# Patient Record
Sex: FEMALE | Race: WHITE | NOT HISPANIC OR LATINO | URBAN - METROPOLITAN AREA
[De-identification: names, ages, dates, MRNs, and addresses within clinical notes are randomized per-mention and may not be internally consistent; named-entity substitution may affect disease eponyms.]

---

## 2017-09-10 ENCOUNTER — INPATIENT (INPATIENT)
Facility: HOSPITAL | Age: 77
LOS: 4 days | Discharge: HOME | End: 2017-09-15
Attending: SURGERY | Admitting: SURGERY
Payer: MEDICARE

## 2017-09-10 VITALS
SYSTOLIC BLOOD PRESSURE: 138 MMHG | OXYGEN SATURATION: 100 % | HEART RATE: 78 BPM | TEMPERATURE: 98 F | RESPIRATION RATE: 16 BRPM | DIASTOLIC BLOOD PRESSURE: 71 MMHG

## 2017-09-10 DIAGNOSIS — K85.90 ACUTE PANCREATITIS WITHOUT NECROSIS OR INFECTION, UNSPECIFIED: ICD-10-CM

## 2017-09-10 DIAGNOSIS — K85.10 BILIARY ACUTE PANCREATITIS WITHOUT NECROSIS OR INFECTION: ICD-10-CM

## 2017-09-10 LAB
ALBUMIN SERPL ELPH-MCNC: 3.7 G/DL — SIGNIFICANT CHANGE UP (ref 3.3–5)
ALP SERPL-CCNC: 295 U/L — HIGH (ref 40–120)
ALT FLD-CCNC: 647 U/L — HIGH (ref 4–33)
APPEARANCE UR: CLEAR — SIGNIFICANT CHANGE UP
AST SERPL-CCNC: 414 U/L — HIGH (ref 4–32)
BASOPHILS # BLD AUTO: 0.01 K/UL — SIGNIFICANT CHANGE UP (ref 0–0.2)
BASOPHILS NFR BLD AUTO: 0.1 % — SIGNIFICANT CHANGE UP (ref 0–2)
BASOPHILS NFR SPEC: 0 % — SIGNIFICANT CHANGE UP (ref 0–2)
BILIRUB SERPL-MCNC: 5.3 MG/DL — HIGH (ref 0.2–1.2)
BILIRUB UR-MCNC: SIGNIFICANT CHANGE UP
BLOOD UR QL VISUAL: NEGATIVE — SIGNIFICANT CHANGE UP
BUN SERPL-MCNC: 20 MG/DL — SIGNIFICANT CHANGE UP (ref 7–23)
CALCIUM SERPL-MCNC: 8.7 MG/DL — SIGNIFICANT CHANGE UP (ref 8.4–10.5)
CHLORIDE SERPL-SCNC: 99 MMOL/L — SIGNIFICANT CHANGE UP (ref 98–107)
CK MB BLD-MCNC: 2.34 NG/ML — SIGNIFICANT CHANGE UP (ref 1–4.7)
CK SERPL-CCNC: 108 U/L — SIGNIFICANT CHANGE UP (ref 25–170)
CO2 SERPL-SCNC: 22 MMOL/L — SIGNIFICANT CHANGE UP (ref 22–31)
COLOR SPEC: YELLOW — SIGNIFICANT CHANGE UP
CREAT SERPL-MCNC: 0.97 MG/DL — SIGNIFICANT CHANGE UP (ref 0.5–1.3)
EOSINOPHIL # BLD AUTO: 0.01 K/UL — SIGNIFICANT CHANGE UP (ref 0–0.5)
EOSINOPHIL NFR BLD AUTO: 0.1 % — SIGNIFICANT CHANGE UP (ref 0–6)
EOSINOPHIL NFR FLD: 0 % — SIGNIFICANT CHANGE UP (ref 0–6)
GIANT PLATELETS BLD QL SMEAR: PRESENT — SIGNIFICANT CHANGE UP
GLUCOSE SERPL-MCNC: 128 MG/DL — HIGH (ref 70–99)
GLUCOSE UR-MCNC: NEGATIVE — SIGNIFICANT CHANGE UP
HCT VFR BLD CALC: 43 % — SIGNIFICANT CHANGE UP (ref 34.5–45)
HGB BLD-MCNC: 14.5 G/DL — SIGNIFICANT CHANGE UP (ref 11.5–15.5)
IMM GRANULOCYTES # BLD AUTO: 0.03 # — SIGNIFICANT CHANGE UP
IMM GRANULOCYTES NFR BLD AUTO: 0.3 % — SIGNIFICANT CHANGE UP (ref 0–1.5)
KETONES UR-MCNC: SIGNIFICANT CHANGE UP
LEUKOCYTE ESTERASE UR-ACNC: HIGH
LIDOCAIN IGE QN: > 600 U/L — HIGH (ref 7–60)
LYMPHOCYTES # BLD AUTO: 0.44 K/UL — LOW (ref 1–3.3)
LYMPHOCYTES # BLD AUTO: 3.9 % — LOW (ref 13–44)
LYMPHOCYTES NFR SPEC AUTO: 2.6 % — LOW (ref 13–44)
MANUAL SMEAR VERIFICATION: SIGNIFICANT CHANGE UP
MCHC RBC-ENTMCNC: 29.2 PG — SIGNIFICANT CHANGE UP (ref 27–34)
MCHC RBC-ENTMCNC: 33.7 % — SIGNIFICANT CHANGE UP (ref 32–36)
MCV RBC AUTO: 86.7 FL — SIGNIFICANT CHANGE UP (ref 80–100)
MONOCYTES # BLD AUTO: 0.62 K/UL — SIGNIFICANT CHANGE UP (ref 0–0.9)
MONOCYTES NFR BLD AUTO: 5.5 % — SIGNIFICANT CHANGE UP (ref 2–14)
MONOCYTES NFR BLD: 3.5 % — SIGNIFICANT CHANGE UP (ref 2–9)
MORPHOLOGY BLD-IMP: NORMAL — SIGNIFICANT CHANGE UP
NEUTROPHIL AB SER-ACNC: 93 % — HIGH (ref 43–77)
NEUTROPHILS # BLD AUTO: 10.16 K/UL — HIGH (ref 1.8–7.4)
NEUTROPHILS NFR BLD AUTO: 90.1 % — HIGH (ref 43–77)
NEUTS BAND # BLD: 0.9 % — SIGNIFICANT CHANGE UP (ref 0–6)
NITRITE UR-MCNC: NEGATIVE — SIGNIFICANT CHANGE UP
NRBC # FLD: 0 — SIGNIFICANT CHANGE UP
PH UR: 6 — SIGNIFICANT CHANGE UP (ref 4.6–8)
PLATELET # BLD AUTO: 176 K/UL — SIGNIFICANT CHANGE UP (ref 150–400)
PLATELET COUNT - ESTIMATE: NORMAL — SIGNIFICANT CHANGE UP
PMV BLD: 10.6 FL — SIGNIFICANT CHANGE UP (ref 7–13)
POTASSIUM SERPL-MCNC: 4.4 MMOL/L — SIGNIFICANT CHANGE UP (ref 3.5–5.3)
POTASSIUM SERPL-SCNC: 4.4 MMOL/L — SIGNIFICANT CHANGE UP (ref 3.5–5.3)
PROT SERPL-MCNC: 7 G/DL — SIGNIFICANT CHANGE UP (ref 6–8.3)
PROT UR-MCNC: 10 — SIGNIFICANT CHANGE UP
RBC # BLD: 4.96 M/UL — SIGNIFICANT CHANGE UP (ref 3.8–5.2)
RBC # FLD: 12.4 % — SIGNIFICANT CHANGE UP (ref 10.3–14.5)
RBC CASTS # UR COMP ASSIST: SIGNIFICANT CHANGE UP (ref 0–?)
SODIUM SERPL-SCNC: 138 MMOL/L — SIGNIFICANT CHANGE UP (ref 135–145)
SP GR SPEC: 1.01 — SIGNIFICANT CHANGE UP (ref 1–1.03)
SQUAMOUS # UR AUTO: SIGNIFICANT CHANGE UP
TROPONIN T SERPL-MCNC: < 0.06 NG/ML — SIGNIFICANT CHANGE UP (ref 0–0.06)
UROBILINOGEN FLD QL: NORMAL E.U. — SIGNIFICANT CHANGE UP (ref 0.1–0.2)
WBC # BLD: 11.27 K/UL — HIGH (ref 3.8–10.5)
WBC # FLD AUTO: 11.27 K/UL — HIGH (ref 3.8–10.5)
WBC UR QL: SIGNIFICANT CHANGE UP (ref 0–?)

## 2017-09-10 PROCEDURE — 71020: CPT | Mod: 26

## 2017-09-10 PROCEDURE — 76705 ECHO EXAM OF ABDOMEN: CPT | Mod: 26

## 2017-09-10 RX ORDER — MORPHINE SULFATE 50 MG/1
2 CAPSULE, EXTENDED RELEASE ORAL EVERY 4 HOURS
Qty: 0 | Refills: 0 | Status: DISCONTINUED | OUTPATIENT
Start: 2017-09-10 | End: 2017-09-11

## 2017-09-10 RX ORDER — CEFOTETAN DISODIUM 1 G
2 VIAL (EA) INJECTION ONCE
Qty: 0 | Refills: 0 | Status: COMPLETED | OUTPATIENT
Start: 2017-09-10 | End: 2017-09-11

## 2017-09-10 RX ORDER — SODIUM CHLORIDE 9 MG/ML
1000 INJECTION INTRAMUSCULAR; INTRAVENOUS; SUBCUTANEOUS ONCE
Qty: 0 | Refills: 0 | Status: COMPLETED | OUTPATIENT
Start: 2017-09-10 | End: 2017-09-10

## 2017-09-10 RX ORDER — CEFOTETAN DISODIUM 1 G
2 VIAL (EA) INJECTION EVERY 12 HOURS
Qty: 0 | Refills: 0 | Status: DISCONTINUED | OUTPATIENT
Start: 2017-09-11 | End: 2017-09-14

## 2017-09-10 RX ORDER — ACETAMINOPHEN 500 MG
650 TABLET ORAL ONCE
Qty: 0 | Refills: 0 | Status: COMPLETED | OUTPATIENT
Start: 2017-09-10 | End: 2017-09-10

## 2017-09-10 RX ORDER — PANTOPRAZOLE SODIUM 20 MG/1
40 TABLET, DELAYED RELEASE ORAL DAILY
Qty: 0 | Refills: 0 | Status: DISCONTINUED | OUTPATIENT
Start: 2017-09-10 | End: 2017-09-14

## 2017-09-10 RX ORDER — CEFOTETAN DISODIUM 1 G
VIAL (EA) INJECTION
Qty: 0 | Refills: 0 | Status: DISCONTINUED | OUTPATIENT
Start: 2017-09-11 | End: 2017-09-14

## 2017-09-10 RX ORDER — FAMOTIDINE 10 MG/ML
20 INJECTION INTRAVENOUS ONCE
Qty: 0 | Refills: 0 | Status: COMPLETED | OUTPATIENT
Start: 2017-09-10 | End: 2017-09-10

## 2017-09-10 RX ORDER — LIDOCAINE 4 G/100G
10 CREAM TOPICAL ONCE
Qty: 0 | Refills: 0 | Status: COMPLETED | OUTPATIENT
Start: 2017-09-10 | End: 2017-09-10

## 2017-09-10 RX ORDER — ENOXAPARIN SODIUM 100 MG/ML
40 INJECTION SUBCUTANEOUS DAILY
Qty: 0 | Refills: 0 | Status: DISCONTINUED | OUTPATIENT
Start: 2017-09-10 | End: 2017-09-15

## 2017-09-10 RX ORDER — MORPHINE SULFATE 50 MG/1
2 CAPSULE, EXTENDED RELEASE ORAL ONCE
Qty: 0 | Refills: 0 | Status: DISCONTINUED | OUTPATIENT
Start: 2017-09-10 | End: 2017-09-10

## 2017-09-10 RX ORDER — ONDANSETRON 8 MG/1
4 TABLET, FILM COATED ORAL ONCE
Qty: 0 | Refills: 0 | Status: COMPLETED | OUTPATIENT
Start: 2017-09-10 | End: 2017-09-10

## 2017-09-10 RX ORDER — SODIUM CHLORIDE 9 MG/ML
1000 INJECTION INTRAMUSCULAR; INTRAVENOUS; SUBCUTANEOUS
Qty: 0 | Refills: 0 | Status: DISCONTINUED | OUTPATIENT
Start: 2017-09-10 | End: 2017-09-11

## 2017-09-10 RX ADMIN — MORPHINE SULFATE 2 MILLIGRAM(S): 50 CAPSULE, EXTENDED RELEASE ORAL at 23:22

## 2017-09-10 RX ADMIN — FAMOTIDINE 20 MILLIGRAM(S): 10 INJECTION INTRAVENOUS at 20:04

## 2017-09-10 RX ADMIN — SODIUM CHLORIDE 1000 MILLILITER(S): 9 INJECTION INTRAMUSCULAR; INTRAVENOUS; SUBCUTANEOUS at 20:04

## 2017-09-10 RX ADMIN — MORPHINE SULFATE 2 MILLIGRAM(S): 50 CAPSULE, EXTENDED RELEASE ORAL at 22:06

## 2017-09-10 RX ADMIN — Medication 30 MILLILITER(S): at 20:04

## 2017-09-10 RX ADMIN — SODIUM CHLORIDE 85 MILLILITER(S): 9 INJECTION INTRAMUSCULAR; INTRAVENOUS; SUBCUTANEOUS at 23:38

## 2017-09-10 RX ADMIN — ONDANSETRON 4 MILLIGRAM(S): 8 TABLET, FILM COATED ORAL at 22:06

## 2017-09-10 RX ADMIN — Medication 650 MILLIGRAM(S): at 20:04

## 2017-09-10 RX ADMIN — LIDOCAINE 10 MILLILITER(S): 4 CREAM TOPICAL at 20:04

## 2017-09-10 NOTE — ED PROVIDER NOTE - MEDICAL DECISION MAKING DETAILS
76 yo with epigastric burning pain; gastritis vs gerd vs pancreatitis secondary to gallstones; cbc cmp lipase troponin ekg fluids gi cocktail xray gallbladder sono --> re eval

## 2017-09-10 NOTE — H&P ADULT - NSHPREVIEWOFSYSTEMS_GEN_ALL_CORE
She has no significant medical history. She has not previously had any surgery. She denied having had cardiovascular, pulmonary, renal, hepatic, hematologic, CNS, endocrine and / or neoplastic illnesses. Prior to the current hospitalization she did not take any medications. She has no known medication allergies and she is not allergic to latex. She does not use ethanol or tobacco.    Her 10-point review of systems is otherwise negative.

## 2017-09-10 NOTE — H&P ADULT - NSHPLABSRESULTS_GEN_ALL_CORE
CBC Full  -  ( 10 Sep 2017 19:00 )  WBC Count : 11.27 K/uL  Hemoglobin : 14.5 g/dL  Hematocrit : 43.0 %  Platelet Count - Automated : 176 K/uL  Mean Cell Volume : 86.7 fL  Mean Cell Hemoglobin : 29.2 pg  Mean Cell Hemoglobin Concentration : 33.7 %  Auto Neutrophil # : 10.16 K/uL  Auto Lymphocyte # : 0.44 K/uL  Auto Monocyte # : 0.62 K/uL  Auto Eosinophil # : 0.01 K/uL  Auto Basophil # : 0.01 K/uL  Auto Neutrophil % : 90.1 %  Auto Lymphocyte % : 3.9 %  Auto Monocyte % : 5.5 %  Auto Eosinophil % : 0.1 %  Auto Basophil % : 0.1 %    Comprehensive Metabolic Panel (09.10.17 @ 19:00)    Sodium, Serum: 138 mmol/L    Potassium, Serum: 4.4: SPECIMEN MODERATELY HEMOLYZED mmol/L    Chloride, Serum: 99 mmol/L    Carbon Dioxide, Serum: 22 mmol/L    Blood Urea Nitrogen, Serum: 20 mg/dL    Creatinine, Serum: 0.97 mg/dL    Glucose, Serum: 128 mg/dL    Calcium, Total Serum: 8.7 mg/dL    Protein Total, Serum: 7.0 g/dL    Albumin, Serum: 3.7 g/dL    Bilirubin Total, Serum: 5.3 mg/dL    Alkaline Phosphatase, Serum: 295: Please note new reference ranges are adjusted for age and  gender. u/L    Aspartate Aminotransferase (AST/SGOT): 414 u/L    Alanine Aminotransferase (ALT/SGPT): 647 u/L    eGFR if Non : 56: The units for eGFR are ml/min/1.73m2 (normalized body  surface area). The eGFR is calculated from a serum  creatinine using the CKD-EPI equation. Other variables  required for calculation are race, age and sex. Among  patients with chronic kidney dise56: ase (CKD), the eGFR is  useful in determining the stage of disease according to  KDOQI CKD classification. All eGFR results are reported  numerically with the following interpretation.    GFR  (ml/min/1.73 m2)          W/KIDNEY DAMAGE    W/O KIDNEY DM56: G  ==========================================================  >= 90.......................Stage 1..............Normal  60-89.......................Stage 2...........Decreased GFR  30-59.......................Stage 3..............Stage 3  15-29.......56: ................Stage 4..............Stage 4  < 15........................Stage 5..............Stage 5    < from: US Abdomen Limited (09.10.17 @ 20:37) >      EXAM:  US ABDOMEN LIMITED        PROCEDURE DATE:  Sep 10 2017         INTERPRETATION:  CLINICAL INFORMATION: Epigastric/right upper quadrant   abdominal pain for one day.    COMPARISON: None available.    TECHNIQUE: Sonography of the right upper quadrant.     FINDINGS:    Liver: 14.0 cm. Within normal limits.  Bile ducts: Normal caliber. Common bile duct is dilated measuring 11 mm   without discrete evidence of choledocholithiasis.  Gallbladder: Cholelithiasis. No gallbladder wall thickening or  pericholecystic fluid. No sonographic Acosta's sign.   Pancreas: Visualized portions are within normal limits.  Right kidney: 9.1 cm. No hydronephrosis.  Ascites: None.  IVC: Visualized portions are within normal limits.    IMPRESSION:     1. Cholelithiasis without sonographic evidence of cholecystitis.  2. Dilated common bile duct without discrete evidence of   choledocholithiasis. Correlate with LFTs.  BETSY MORRISON M.D., Radiology Resident  This document has been electronically signed.  JOSE JORDAN M.D., ATTENDING RADIOLOGIST  This document has been electronically signed. Sep 10 2017  9:57PM

## 2017-09-10 NOTE — ED ADULT NURSE NOTE - OBJECTIVE STATEMENT
Pt to spot # 28 alert and oriented X 3 with family at bedside, pt comes with  epigastric pains intermittent x past mo, increased  since yesterday, following meal. pain radiates to back intermittently, denies presently. vomited x 1 yesterday. denies nausea. pmh- gallstones, labs drawn and sent , vitals as noted, waiting for MD caldwell will monitor

## 2017-09-10 NOTE — ED PROVIDER NOTE - ATTENDING CONTRIBUTION TO CARE
Attending note:   After face to face evaluation of this patient, I concur with above noted hx, pe, and care plan for this patient. +Epigastric pain for 24 hours in this F with h/o gallstones.   No cp.   +Epiogastric pain on exam.  evaluation in progress

## 2017-09-10 NOTE — H&P ADULT - NSHPPHYSICALEXAM_GEN_ALL_CORE
Vital Signs Last 24 Hrs  T(C): 37 (10 Sep 2017 20:38), Max: 37 (10 Sep 2017 20:38)  T(F): 98.6 (10 Sep 2017 20:38), Max: 98.6 (10 Sep 2017 20:38)  HR: 80 (10 Sep 2017 20:38) (78 - 80)  BP: 136/71 (10 Sep 2017 20:38) (136/71 - 138/69)  RR: 16 (10 Sep 2017 20:38) (16 - 16)  SpO2: 100% (10 Sep 2017 20:38) (100% - 100%)    She was awake, alert and in no distress  She was anicteric and she did not have thrush. Her oropharyngeal mucosa was normal. She had reactive pupils and her extra-occular movements were intact. She did not have JVD. Her lungs were clear bilaterally and she had non-labored respirations. She had symmetrical chest wall movements. She had regular heart tones and she did not have a murmur or gallop. Her abdomen was soft, nondistended with tenderness in the right lower quadrant. She did have rebound. - Psoas and obturator signs. There were no palpable masses or abdominal wall hernias. She had active bowel sounds. She had normal external genitalia. She did not have a rash. Her extremities were well perfused. She had a normal musculoskeletal exam. Vital Signs Last 24 Hrs  T(C): 37 (10 Sep 2017 20:38), Max: 37 (10 Sep 2017 20:38)  T(F): 98.6 (10 Sep 2017 20:38), Max: 98.6 (10 Sep 2017 20:38)  HR: 80 (10 Sep 2017 20:38) (78 - 80)  BP: 136/71 (10 Sep 2017 20:38) (136/71 - 138/69)  RR: 16 (10 Sep 2017 20:38) (16 - 16)  SpO2: 100% (10 Sep 2017 20:38) (100% - 100%)    She was awake, alert and in no distress  She was anicteric and she did not have thrush. Her oropharyngeal mucosa was normal. She had reactive pupils and her extra-occular movements were intact. She did not have JVD. Her lungs were clear bilaterally and she had non-labored respirations. She had symmetrical chest wall movements. She had regular heart tones and she did not have a murmur or gallop. Her abdomen was soft, nondistended with tenderness in the right upper quadrant. There were no palpable masses or abdominal wall hernias. She had active bowel sounds. She had normal external genitalia. She did not have a rash. Her extremities were well perfused. She had a normal musculoskeletal exam.

## 2017-09-10 NOTE — H&P ADULT - HISTORY OF PRESENT ILLNESS
This is a very pleasant 77 year old female who presents with abdominal pain which began at 24 hours prior to admission. It was initially located in the right upper quadrant/epigastrium which radiated to her back.  She describes the pain as dull nonradiating and constant. No alleviating factors could be elicited.  She is accompanied by her daughter. She denies any recent trauma. The pain was without fever, chills,or, diarrhea. She has had several episodes of NBNB emesis. She has not had any recent sick exposures and denied having had foreign travel. She has not had urinary symptoms of urgency, frequency or dysuria. She is not sexually active.    [facility] ED demonstrated transaminitis, hyperlipasemia, and leukocytosis. U/S demonstrated CBD 11mm without evidence of choledocholithiasis. Cholelithiasis present.

## 2017-09-10 NOTE — ED PROVIDER NOTE - PROGRESS NOTE DETAILS
patient does not want ct scan because was told by surgery that she does not need scan as she is getting a mrcp tomorrow. -Gavino

## 2017-09-10 NOTE — H&P ADULT - PROBLEM SELECTOR PLAN 1
1)	Admit her to the  Surgical Service under Dr. Lora   2) 	Keep her NPO and give parenteral fluid to treat her dehydration.  3)	Treat her with parenteral antibiotics pre-operatively, Cefotetan  4)	Give narcotics to treat her acute pain PRN  5)	Provide DVT prophylaxis, venodynes intraoperatively  6)           Trend LFTs, if persistent will proceed with MRCP and/or GI consultation.   7)	Plan to have her undergo laparoscopic Cholecystectomy. The risks, benefits, and alternatives to the planned operation were presented to the patient and her mother and father as were the risks and benefits of the alternatives. The alternatives are non-operative treatment.   8)	Full support in place

## 2017-09-10 NOTE — ED PROVIDER NOTE - OBJECTIVE STATEMENT
76 yo female with no pmh presenting with intermittent 1 month hx of epigastric burning pain with no radiation.  over the last day, pain has become more constant after patient vomited once yesterday after taking aspirin and eating ribs.  7-8/10 in severity.  worse when eating food, not improved with anything.  known hx of gallstones.  denies fevers chills diarrhea.  endorses urinary frequency x 2 days.  not currently nauseous.    PCP- Neal Guevara

## 2017-09-10 NOTE — ED ADULT TRIAGE NOTE - CHIEF COMPLAINT QUOTE
epigastric pains intermittent x past mo, increased  since yesterday,following meal. pain radiates to back intermittently,denies presently. vomited x 1 yesterday. denies nausea. pmh- gallstones

## 2017-09-11 LAB
ALBUMIN SERPL ELPH-MCNC: 3.3 G/DL — SIGNIFICANT CHANGE UP (ref 3.3–5)
ALP SERPL-CCNC: 259 U/L — HIGH (ref 40–120)
ALT FLD-CCNC: 456 U/L — HIGH (ref 4–33)
AST SERPL-CCNC: 227 U/L — HIGH (ref 4–32)
BILIRUB SERPL-MCNC: 5.5 MG/DL — HIGH (ref 0.2–1.2)
BUN SERPL-MCNC: 14 MG/DL — SIGNIFICANT CHANGE UP (ref 7–23)
CALCIUM SERPL-MCNC: 8.2 MG/DL — LOW (ref 8.4–10.5)
CHLORIDE SERPL-SCNC: 105 MMOL/L — SIGNIFICANT CHANGE UP (ref 98–107)
CO2 SERPL-SCNC: 23 MMOL/L — SIGNIFICANT CHANGE UP (ref 22–31)
CREAT SERPL-MCNC: 1 MG/DL — SIGNIFICANT CHANGE UP (ref 0.5–1.3)
GLUCOSE SERPL-MCNC: 91 MG/DL — SIGNIFICANT CHANGE UP (ref 70–99)
HCT VFR BLD CALC: 39.4 % — SIGNIFICANT CHANGE UP (ref 34.5–45)
HGB BLD-MCNC: 13.3 G/DL — SIGNIFICANT CHANGE UP (ref 11.5–15.5)
LIDOCAIN IGE QN: > 600 U/L — HIGH (ref 7–60)
MCHC RBC-ENTMCNC: 29.4 PG — SIGNIFICANT CHANGE UP (ref 27–34)
MCHC RBC-ENTMCNC: 33.8 % — SIGNIFICANT CHANGE UP (ref 32–36)
MCV RBC AUTO: 87 FL — SIGNIFICANT CHANGE UP (ref 80–100)
NRBC # FLD: 0 — SIGNIFICANT CHANGE UP
PLATELET # BLD AUTO: 150 K/UL — SIGNIFICANT CHANGE UP (ref 150–400)
PMV BLD: 10.3 FL — SIGNIFICANT CHANGE UP (ref 7–13)
POTASSIUM SERPL-MCNC: 3.6 MMOL/L — SIGNIFICANT CHANGE UP (ref 3.5–5.3)
POTASSIUM SERPL-SCNC: 3.6 MMOL/L — SIGNIFICANT CHANGE UP (ref 3.5–5.3)
PROT SERPL-MCNC: 5.8 G/DL — LOW (ref 6–8.3)
RBC # BLD: 4.53 M/UL — SIGNIFICANT CHANGE UP (ref 3.8–5.2)
RBC # FLD: 12.5 % — SIGNIFICANT CHANGE UP (ref 10.3–14.5)
SODIUM SERPL-SCNC: 142 MMOL/L — SIGNIFICANT CHANGE UP (ref 135–145)
WBC # BLD: 9.34 K/UL — SIGNIFICANT CHANGE UP (ref 3.8–10.5)
WBC # FLD AUTO: 9.34 K/UL — SIGNIFICANT CHANGE UP (ref 3.8–10.5)

## 2017-09-11 PROCEDURE — 99233 SBSQ HOSP IP/OBS HIGH 50: CPT

## 2017-09-11 PROCEDURE — 74182 MRI ABDOMEN W/CONTRAST: CPT | Mod: 26

## 2017-09-11 RX ORDER — SODIUM CHLORIDE 9 MG/ML
1000 INJECTION, SOLUTION INTRAVENOUS
Qty: 0 | Refills: 0 | Status: DISCONTINUED | OUTPATIENT
Start: 2017-09-11 | End: 2017-09-14

## 2017-09-11 RX ADMIN — Medication 100 GRAM(S): at 00:32

## 2017-09-11 RX ADMIN — Medication 100 GRAM(S): at 18:15

## 2017-09-11 RX ADMIN — SODIUM CHLORIDE 85 MILLILITER(S): 9 INJECTION INTRAMUSCULAR; INTRAVENOUS; SUBCUTANEOUS at 13:29

## 2017-09-11 RX ADMIN — PANTOPRAZOLE SODIUM 40 MILLIGRAM(S): 20 TABLET, DELAYED RELEASE ORAL at 13:27

## 2017-09-11 RX ADMIN — Medication 100 GRAM(S): at 13:28

## 2017-09-11 RX ADMIN — ENOXAPARIN SODIUM 40 MILLIGRAM(S): 100 INJECTION SUBCUTANEOUS at 13:28

## 2017-09-11 NOTE — PROGRESS NOTE ADULT - SUBJECTIVE AND OBJECTIVE BOX
B Team Surgery    SUBJECTIVE: Pt reports feeling better then last night and having decreased abd pain.  PT denies N/V, f/c and/or back pain at this time.     MEDICATIONS  (STANDING):  sodium chloride 0.9%. 1000 milliLiter(s) (85 mL/Hr) IV Continuous <Continuous>  enoxaparin Injectable 40 milliGRAM(s) SubCutaneous daily  pantoprazole  Injectable 40 milliGRAM(s) IV Push daily  cefoTEtan  IVPB      cefoTEtan  IVPB 2 Gram(s) IV Intermittent every 12 hours    MEDICATIONS  (PRN):  morphine  - Injectable 2 milliGRAM(s) IV Push every 4 hours PRN Severe Pain (7 - 10)      Physical Exam:    Vital Signs Last 24 Hrs  T(C): 37.1 (11 Sep 2017 05:12), Max: 37.2 (11 Sep 2017 02:34)  T(F): 98.8 (11 Sep 2017 05:12), Max: 98.9 (11 Sep 2017 02:34)  HR: 73 (11 Sep 2017 05:12) (73 - 80)  BP: 119/50 (11 Sep 2017 05:12) (119/50 - 138/71)  BP(mean): --  RR: 16 (11 Sep 2017 05:12) (16 - 16)  SpO2: 96% (11 Sep 2017 05:12) (96% - 100%)    General:  Pt in NAD, A & O x3    Abdominal: soft, RUQ and epigastric tenderness, mildly distended.  No guarding no rebound tenderness    Ext: warm B/L LE no edema       I&O's Detail    10 Sep 2017 07:01  -  11 Sep 2017 07:00  --------------------------------------------------------  IN:    sodium chloride 0.9%.: 510 mL  Total IN: 510 mL    OUT:  Total OUT: 0 mL    Total NET: 510 mL          LABS:                        13.3   9.34  )-----------( 150      ( 11 Sep 2017 05:30 )             39.4     09-11    142  |  105  |  14  ----------------------------<  91  3.6   |  23  |  1.00    Ca    8.2<L>      11 Sep 2017 05:30    TPro  5.8<L>  /  Alb  3.3  /  TBili  5.5<H>  /  DBili  x   /  AST  227<H>  /  ALT  456<H>  /  AlkPhos  259<H>        Urinalysis Basic - ( 10 Sep 2017 21:00 )    Color: YELLOW / Appearance: CLEAR / S.009 / pH: 6.0  Gluc: NEGATIVE / Ketone: SMALL  / Bili: SMALL / Urobili: NORMAL E.U.   Blood: NEGATIVE / Protein: 10 / Nitrite: NEGATIVE   Leuk Esterase: SMALL / RBC: 10-25 / WBC 2-5   Sq Epi: OCC / Non Sq Epi: x / Bacteria: x       A/P: 78 yo F with gallstone pancreatitis     Neuro:  CV:   GI:    Renal:  Endocrine: B Team Surgery    SUBJECTIVE: Pt reports feeling better then last night and having decreased abd pain.  PT denies N/V, f/c and/or back pain at this time.     MEDICATIONS  (STANDING):  sodium chloride 0.9%. 1000 milliLiter(s) (85 mL/Hr) IV Continuous <Continuous>  enoxaparin Injectable 40 milliGRAM(s) SubCutaneous daily  pantoprazole  Injectable 40 milliGRAM(s) IV Push daily  cefoTEtan  IVPB      cefoTEtan  IVPB 2 Gram(s) IV Intermittent every 12 hours    MEDICATIONS  (PRN):  morphine  - Injectable 2 milliGRAM(s) IV Push every 4 hours PRN Severe Pain (7 - 10)      Physical Exam:    Vital Signs Last 24 Hrs  T(C): 37.1 (11 Sep 2017 05:12), Max: 37.2 (11 Sep 2017 02:34)  T(F): 98.8 (11 Sep 2017 05:12), Max: 98.9 (11 Sep 2017 02:34)  HR: 73 (11 Sep 2017 05:12) (73 - 80)  BP: 119/50 (11 Sep 2017 05:12) (119/50 - 138/71)  BP(mean): --  RR: 16 (11 Sep 2017 05:12) (16 - 16)  SpO2: 96% (11 Sep 2017 05:12) (96% - 100%)    General:  Pt in NAD, A & O x3    Abdominal: soft, RUQ and epigastric tenderness, mildly distended.  No guarding no rebound tenderness    Ext: warm B/L LE no edema       I&O's Detail    10 Sep 2017 07:01  -  11 Sep 2017 07:00  --------------------------------------------------------  IN:    sodium chloride 0.9%.: 510 mL  Total IN: 510 mL    OUT:  Total OUT: 0 mL    Total NET: 510 mL          LABS:                        13.3   9.34  )-----------( 150      ( 11 Sep 2017 05:30 )             39.4     09-11    142  |  105  |  14  ----------------------------<  91  3.6   |  23  |  1.00    Ca    8.2<L>      11 Sep 2017 05:30    TPro  5.8<L>  /  Alb  3.3  /  TBili  5.5<H>  /  DBili  x   /  AST  227<H>  /  ALT  456<H>  /  AlkPhos  259<H>        Urinalysis Basic - ( 10 Sep 2017 21:00 )    Color: YELLOW / Appearance: CLEAR / S.009 / pH: 6.0  Gluc: NEGATIVE / Ketone: SMALL  / Bili: SMALL / Urobili: NORMAL E.U.   Blood: NEGATIVE / Protein: 10 / Nitrite: NEGATIVE   Leuk Esterase: SMALL / RBC: 10-25 / WBC 2-5   Sq Epi: OCC / Non Sq Epi: x / Bacteria: x       A/P: 76 yo F with gallstone pancreatitis     Neuro: Pain control with morphine  CV: HD stable continue IVF  GI:  NPO, GI consult called, MRCP, f/u lipase (pending), trend LFTs daily  Renal: Monitor UOP  Endocrine: Glucose WNL  ID: WBC normalized on cefotetan  Dispo: Continue treatment on surgical floor

## 2017-09-11 NOTE — CONSULT NOTE ADULT - ASSESSMENT
Impression:  1. Abdominal pain - differential includes gallstone pancreatitis, choledocholithiasis, cholecystitis, pancreatic cancer causing biliary obstruction  2. Dilated CBD 11mm - differential includes CBD stones, inflammation from pancreatitis causing obstruction, r/o neoplasm    Recommend:  -NPO for now  -Aggressive IVF - would give lactate ringers at 200cc/hr while monitoring respiratory status for overload, for pancreatitis  -Agree with MRCP - ?passed stone as patient's pain is now resolved  -Eventual cholecystectomy per surgical team  -Monitor liver enzymes

## 2017-09-11 NOTE — CONSULT NOTE ADULT - SUBJECTIVE AND OBJECTIVE BOX
GI ADVANCED ENDOSCOPY CONSULT      Chief Complaint:  Patient is a 77y old  Female who presents with a chief complaint of Gallstone Pancreatitis (10 Sep 2017 23:23)      HPI: 77 female with many bouts of biliary colic pain, presents with abdominal pain. Patient started to have abdominal pain with radiation in the back severe, 2 nights ago. The pain is worse with eating - she ate steak and potatoes anyway after the onset of pain. She also had nausea/vomiting after taking some NSAIDs, but no fevers or chills noted. She denies any jaundice, pruritis, or changes in her bowel movement. Her urine has been darker than usual. She denies any ETOH use. She has had multiple bouts of biliary colic pain in the past since 1970- she usually ignores it, but in the past has been to the doctor where she had sonograms confirming gallstones. When asked why she did not have a cholecystectomy, she replies she is stubborn and just didn't want to get it done.    Allergies:  No Known Allergies      Home Medications: None: PRN NSAIDs    Hospital Medications:  sodium chloride 0.9%. 1000 milliLiter(s) IV Continuous <Continuous>  morphine  - Injectable 2 milliGRAM(s) IV Push every 4 hours PRN  enoxaparin Injectable 40 milliGRAM(s) SubCutaneous daily  pantoprazole  Injectable 40 milliGRAM(s) IV Push daily  cefoTEtan  IVPB      cefoTEtan  IVPB 2 Gram(s) IV Intermittent every 12 hours      PMHX/PSHX:  Biliary colic    No significant past surgical history      Family history: Father  in 80's of pancreatic cancer    Social History: Denies any ETOH, smoking or illicit drugs    ROS:     General:  No wt loss, fevers, chills, night sweats, fatigue,   Eyes:  Good vision, no reported pain  ENT:  No sore throat, pain, runny nose, dysphagia  CV:  No pain, palpitations, hypo/hypertension  Resp:  No dyspnea, cough, tachypnea, wheezing  GI:  See HPI  :  Urine darker than usual  Muscle:  No pain, weakness  Neuro:  No weakness, tingling, memory problems  Psych:  No fatigue, insomnia, mood problems, depression  Endocrine:  No polyuria, polydipsia, cold/heat intolerance  Heme:  No petechiae, ecchymosis, easy bruisability  Skin:  No rash, edema      PHYSICAL EXAM:     GENERAL:  Appears stated age, well-groomed, well-nourished, no distress  HEENT:  NC/AT,  conjunctivae clear and pink  CHEST:  Full & symmetric excursion, no increased effort, breath sounds clear  HEART:  Regular rhythm, S1, S2, no murmur/rub/S3/S4, no abdominal bruit, no edema  ABDOMEN:  Soft, non-tender now, non-distended, normoactive bowel sounds,  no masses ,  EXTREMITIES:  no cyanosis,clubbing or edema  SKIN:  No rash/erythema/ecchymoses  NEURO:  Alert, oriented    Vital Signs:  Vital Signs Last 24 Hrs  T(C): 37.2 (11 Sep 2017 11:03), Max: 37.2 (11 Sep 2017 02:34)  T(F): 98.9 (11 Sep 2017 11:03), Max: 98.9 (11 Sep 2017 02:34)  HR: 72 (11 Sep 2017 11:) (72 - 80)  BP: 117/66 (11 Sep 2017 11:03) (117/66 - 138/69)  BP(mean): --  RR: 17 (11 Sep 2017 11:03) (16 - 17)  SpO2: 98% (11 Sep 2017 11:03) (96% - 100%)  Daily     Daily     LABS:                        13.3   9.34  )-----------( 150      ( 11 Sep 2017 05:30 )             39.4         142  |  105  |  14  ----------------------------<  91  3.6   |  23  |  1.00    Ca    8.2<L>      11 Sep 2017 05:30    TPro  5.8<L>  /  Alb  3.3  /  TBili  5.5<H>  /  DBili  x   /  AST  227<H>  /  ALT  456<H>  /  AlkPhos  259<H>      Bilirubin Total, Serum: 5.5 mg/dL (17 @ 05:30)  Bilirubin Total, Serum: 5.3 mg/dL (09-10-17 @ 19:00)      LIVER FUNCTIONS - ( 11 Sep 2017 05:30 )  Alb: 3.3 g/dL / Pro: 5.8 g/dL / ALK PHOS: 259 u/L / ALT: 456 u/L / AST: 227 u/L / GGT: x             Urinalysis Basic - ( 10 Sep 2017 21:00 )    Color: YELLOW / Appearance: CLEAR / S.009 / pH: 6.0  Gluc: NEGATIVE / Ketone: SMALL  / Bili: SMALL / Urobili: NORMAL E.U.   Blood: NEGATIVE / Protein: 10 / Nitrite: NEGATIVE   Leuk Esterase: SMALL / RBC: 10-25 / WBC 2-5   Sq Epi: OCC / Non Sq Epi: x / Bacteria: x      Amylase Serum--      Lipase serum> 600       Ammonia--  Amylase Serum--      Lipase serum> 600       Ammonia--      Imaging:        < from: US Abdomen Limited (09.10.17 @ 20:37) >    EXAM:  US ABDOMEN LIMITED        PROCEDURE DATE:  Sep 10 2017         INTERPRETATION:  CLINICAL INFORMATION: Epigastric/right upper quadrant   abdominal pain for one day.    COMPARISON: None available.    TECHNIQUE: Sonography of the right upper quadrant.     FINDINGS:    Liver: 14.0 cm. Within normal limits.  Bile ducts: Normal caliber. Common bile duct is dilated measuring 11 mm   without discrete evidence of choledocholithiasis.  Gallbladder: Cholelithiasis. No gallbladder wall thickening or  pericholecystic fluid. No sonographic Acosta's sign.   Pancreas: Visualized portions are within normal limits.  Right kidney: 9.1 cm. No hydronephrosis.  Ascites: None.  IVC: Visualized portions are within normal limits.    IMPRESSION:     1. Cholelithiasis without sonographic evidence of cholecystitis.  2. Dilated common bile duct without discrete evidence of   choledocholithiasis. Correlate with LFTs.              BETSY MORRISON M.D., Radiology Resident  This document has been electronically signed.  JOSE JORDAN M.D., ATTENDING RADIOLOGIST  This document has been electronically signed. Sep 10 2017  9:57PM                  < end of copied text >

## 2017-09-12 LAB
ALBUMIN SERPL ELPH-MCNC: 2.9 G/DL — LOW (ref 3.3–5)
ALP SERPL-CCNC: 239 U/L — HIGH (ref 40–120)
ALT FLD-CCNC: 261 U/L — HIGH (ref 4–33)
AST SERPL-CCNC: 81 U/L — HIGH (ref 4–32)
BACTERIA UR CULT: SIGNIFICANT CHANGE UP
BILIRUB SERPL-MCNC: 2 MG/DL — HIGH (ref 0.2–1.2)
BLD GP AB SCN SERPL QL: NEGATIVE — SIGNIFICANT CHANGE UP
BUN SERPL-MCNC: 13 MG/DL — SIGNIFICANT CHANGE UP (ref 7–23)
CALCIUM SERPL-MCNC: 8.1 MG/DL — LOW (ref 8.4–10.5)
CHLORIDE SERPL-SCNC: 102 MMOL/L — SIGNIFICANT CHANGE UP (ref 98–107)
CO2 SERPL-SCNC: 21 MMOL/L — LOW (ref 22–31)
CREAT SERPL-MCNC: 0.88 MG/DL — SIGNIFICANT CHANGE UP (ref 0.5–1.3)
GLUCOSE SERPL-MCNC: 57 MG/DL — LOW (ref 70–99)
HCT VFR BLD CALC: 37.7 % — SIGNIFICANT CHANGE UP (ref 34.5–45)
HGB BLD-MCNC: 12.7 G/DL — SIGNIFICANT CHANGE UP (ref 11.5–15.5)
LIDOCAIN IGE QN: 134.4 U/L — HIGH (ref 7–60)
MAGNESIUM SERPL-MCNC: 2.5 MG/DL — SIGNIFICANT CHANGE UP (ref 1.6–2.6)
MCHC RBC-ENTMCNC: 30 PG — SIGNIFICANT CHANGE UP (ref 27–34)
MCHC RBC-ENTMCNC: 33.7 % — SIGNIFICANT CHANGE UP (ref 32–36)
MCV RBC AUTO: 89.1 FL — SIGNIFICANT CHANGE UP (ref 80–100)
NRBC # FLD: 0 — SIGNIFICANT CHANGE UP
PHOSPHATE SERPL-MCNC: 2.6 MG/DL — SIGNIFICANT CHANGE UP (ref 2.5–4.5)
PLATELET # BLD AUTO: 153 K/UL — SIGNIFICANT CHANGE UP (ref 150–400)
PMV BLD: 11.2 FL — SIGNIFICANT CHANGE UP (ref 7–13)
POTASSIUM SERPL-MCNC: 3.4 MMOL/L — LOW (ref 3.5–5.3)
POTASSIUM SERPL-SCNC: 3.4 MMOL/L — LOW (ref 3.5–5.3)
PROT SERPL-MCNC: 5.5 G/DL — LOW (ref 6–8.3)
RBC # BLD: 4.23 M/UL — SIGNIFICANT CHANGE UP (ref 3.8–5.2)
RBC # FLD: 12.7 % — SIGNIFICANT CHANGE UP (ref 10.3–14.5)
RH IG SCN BLD-IMP: POSITIVE — SIGNIFICANT CHANGE UP
SODIUM SERPL-SCNC: 141 MMOL/L — SIGNIFICANT CHANGE UP (ref 135–145)
SPECIMEN SOURCE: SIGNIFICANT CHANGE UP
WBC # BLD: 11.2 K/UL — HIGH (ref 3.8–10.5)
WBC # FLD AUTO: 11.2 K/UL — HIGH (ref 3.8–10.5)

## 2017-09-12 PROCEDURE — 99231 SBSQ HOSP IP/OBS SF/LOW 25: CPT | Mod: GC

## 2017-09-12 PROCEDURE — 99222 1ST HOSP IP/OBS MODERATE 55: CPT | Mod: GC

## 2017-09-12 RX ORDER — ONDANSETRON 8 MG/1
4 TABLET, FILM COATED ORAL ONCE
Qty: 0 | Refills: 0 | Status: COMPLETED | OUTPATIENT
Start: 2017-09-12 | End: 2017-09-12

## 2017-09-12 RX ORDER — POTASSIUM CHLORIDE 20 MEQ
20 PACKET (EA) ORAL ONCE
Qty: 0 | Refills: 0 | Status: COMPLETED | OUTPATIENT
Start: 2017-09-12 | End: 2017-09-12

## 2017-09-12 RX ADMIN — Medication 20 MILLIEQUIVALENT(S): at 13:58

## 2017-09-12 RX ADMIN — ENOXAPARIN SODIUM 40 MILLIGRAM(S): 100 INJECTION SUBCUTANEOUS at 13:39

## 2017-09-12 RX ADMIN — SODIUM CHLORIDE 150 MILLILITER(S): 9 INJECTION, SOLUTION INTRAVENOUS at 10:39

## 2017-09-12 RX ADMIN — Medication 100 GRAM(S): at 18:58

## 2017-09-12 RX ADMIN — ONDANSETRON 4 MILLIGRAM(S): 8 TABLET, FILM COATED ORAL at 02:37

## 2017-09-12 RX ADMIN — PANTOPRAZOLE SODIUM 40 MILLIGRAM(S): 20 TABLET, DELAYED RELEASE ORAL at 13:39

## 2017-09-12 RX ADMIN — Medication 100 GRAM(S): at 06:30

## 2017-09-12 NOTE — CHART NOTE - NSCHARTNOTEFT_GEN_A_CORE
MRCP results reviewed - there are 2 small CBD stones in the distal CBD. Discussed with patient and surgery team @ [id number]- plan for ERCP tomorrow, NPO after midnight.

## 2017-09-12 NOTE — PROGRESS NOTE ADULT - ATTENDING COMMENTS
I have personally interviewed and examined this patient, reviewed pertinent labs and imaging, and discussed the case with colleagues, residents, and physician assistants on B Team rounds.  Appreciate GI input.    The active care issues are:  1. choledocholithiasis, for EUS +/- ERCP tomorrow    Lap versus open cholecystectomy to follow depending on outcome of EUS/ERCP.  Patient understands and agrees with plan.
Seen and exam.  Chart and note reviewed.  Case discussed with B team    HD#2 : Acute biliary pancreatitis    S >  Patient states improvement in pain.  She denies nausea, vomiting, angina, shortness of breath.  Currently nil per os  O>  Awake, alert not in distress         Jaundiced         Epigastric tenderness, no masses noted  A/P >      Biliary pancreatitis  a.  Nil per os  b.  Continue IVF resuscitation  c.  Reviewed ultrasound findings CBD 11mm  d.  Trend LFT's  e.  MRCP completed, I observe stones in CBD, obtain official readings  f.  We will discuss findings with GI service, recommend ERCP  g.  Continue cefotetan for cholangitis proophylaxis? at this time

## 2017-09-12 NOTE — PROGRESS NOTE ADULT - SUBJECTIVE AND OBJECTIVE BOX
Morning Surgical Progress Note  Patient is a 77y old  Female who presents with a chief complaint of Gallstone Pancreatitis (10 Sep 2017 23:23)    SUBJECTIVE: Patient seen and examined at bedside with surgical team, patient without complaints this am    Vital Signs Last 24 Hrs  T(C): 36.9 (12 Sep 2017 10:44), Max: 37.2 (11 Sep 2017 11:03)  T(F): 98.5 (12 Sep 2017 10:44), Max: 98.9 (11 Sep 2017 11:03)  HR: 66 (12 Sep 2017 10:44) (66 - 75)  BP: 105/68 (12 Sep 2017 10:44) (105/68 - 135/68)  BP(mean): --  RR: 17 (12 Sep 2017 10:44) (16 - 17)  SpO2: 99% (12 Sep 2017 10:44) (98% - 99%)  I&O's Detail    11 Sep 2017 07:01  -  12 Sep 2017 07:00  --------------------------------------------------------  IN:    IV PiggyBack: 100 mL    lactated ringers.: 1650 mL    sodium chloride 0.9%: 955 mL  Total IN: 2705 mL    OUT:    Voided: 900 mL  Total OUT: 900 mL    Total NET: 1805 mL        MEDICATIONS  (STANDING):  enoxaparin Injectable 40 milliGRAM(s) SubCutaneous daily  pantoprazole  Injectable 40 milliGRAM(s) IV Push daily  cefoTEtan  IVPB      cefoTEtan  IVPB 2 Gram(s) IV Intermittent every 12 hours  lactated ringers. 1000 milliLiter(s) (150 mL/Hr) IV Continuous <Continuous>  potassium chloride    Tablet ER 20 milliEquivalent(s) Oral once    MEDICATIONS  (PRN):      Physical Exam  General: A&Ox3, NAD  Abdominal: soft nontender    LABS:                        12.7   11.20 )-----------( 153      ( 12 Sep 2017 06:20 )             37.7     09-12    141  |  102  |  13  ----------------------------<  57<L>  3.4<L>   |  21<L>  |  0.88    Ca    8.1<L>      12 Sep 2017 06:20  Phos  2.6       Mg     2.5         TPro  5.5<L>  /  Alb  2.9<L>  /  TBili  2.0<H>  /  DBili  x   /  AST  81<H>  /  ALT  261<H>  /  AlkPhos  239<H>        LIVER FUNCTIONS - ( 12 Sep 2017 06:20 )  Alb: 2.9 g/dL / Pro: 5.5 g/dL / ALK PHOS: 239 u/L / ALT: 261 u/L / AST: 81 u/L / GGT: x           Urinalysis Basic - ( 10 Sep 2017 21:00 )    Color: YELLOW / Appearance: CLEAR / S.009 / pH: 6.0  Gluc: NEGATIVE / Ketone: SMALL  / Bili: SMALL / Urobili: NORMAL E.U.   Blood: NEGATIVE / Protein: 10 / Nitrite: NEGATIVE   Leuk Esterase: SMALL / RBC: 10-25 / WBC 2-5   Sq Epi: OCC / Non Sq Epi: x / Bacteria: x          Patient is a 77y Female with gallstone pancreatitis, without complaints this am, s/p MRCP last night awaiting read  -continue clears  -plan for OR tomorrow  -continue antibiotics  -continue DVT ppx  -obtain stat type and screen and am labs in morning

## 2017-09-12 NOTE — PRE-OP CHECKLIST - 1.
Received patient from [address] [place] [address] via [facility].  Report from Nohelia WILLIAM.

## 2017-09-13 LAB
ALBUMIN SERPL ELPH-MCNC: 2.8 G/DL — LOW (ref 3.3–5)
ALP SERPL-CCNC: 222 U/L — HIGH (ref 40–120)
ALT FLD-CCNC: 189 U/L — HIGH (ref 4–33)
APTT BLD: 29.1 SEC — SIGNIFICANT CHANGE UP (ref 27.5–37.4)
AST SERPL-CCNC: 39 U/L — HIGH (ref 4–32)
BILIRUB SERPL-MCNC: 1.1 MG/DL — SIGNIFICANT CHANGE UP (ref 0.2–1.2)
BLD GP AB SCN SERPL QL: NEGATIVE — SIGNIFICANT CHANGE UP
BUN SERPL-MCNC: 6 MG/DL — LOW (ref 7–23)
CALCIUM SERPL-MCNC: 8.3 MG/DL — LOW (ref 8.4–10.5)
CHLORIDE SERPL-SCNC: 104 MMOL/L — SIGNIFICANT CHANGE UP (ref 98–107)
CO2 SERPL-SCNC: 28 MMOL/L — SIGNIFICANT CHANGE UP (ref 22–31)
CREAT SERPL-MCNC: 0.93 MG/DL — SIGNIFICANT CHANGE UP (ref 0.5–1.3)
GLUCOSE SERPL-MCNC: 89 MG/DL — SIGNIFICANT CHANGE UP (ref 70–99)
HCT VFR BLD CALC: 38.8 % — SIGNIFICANT CHANGE UP (ref 34.5–45)
HGB BLD-MCNC: 13.3 G/DL — SIGNIFICANT CHANGE UP (ref 11.5–15.5)
INR BLD: 1 — SIGNIFICANT CHANGE UP (ref 0.88–1.17)
MAGNESIUM SERPL-MCNC: 1.9 MG/DL — SIGNIFICANT CHANGE UP (ref 1.6–2.6)
MCHC RBC-ENTMCNC: 30 PG — SIGNIFICANT CHANGE UP (ref 27–34)
MCHC RBC-ENTMCNC: 34.3 % — SIGNIFICANT CHANGE UP (ref 32–36)
MCV RBC AUTO: 87.6 FL — SIGNIFICANT CHANGE UP (ref 80–100)
NRBC # FLD: 0 — SIGNIFICANT CHANGE UP
PHOSPHATE SERPL-MCNC: 2 MG/DL — LOW (ref 2.5–4.5)
PLATELET # BLD AUTO: 176 K/UL — SIGNIFICANT CHANGE UP (ref 150–400)
PMV BLD: 10.8 FL — SIGNIFICANT CHANGE UP (ref 7–13)
POTASSIUM SERPL-MCNC: 3.9 MMOL/L — SIGNIFICANT CHANGE UP (ref 3.5–5.3)
POTASSIUM SERPL-SCNC: 3.9 MMOL/L — SIGNIFICANT CHANGE UP (ref 3.5–5.3)
PROT SERPL-MCNC: 5.9 G/DL — LOW (ref 6–8.3)
PROTHROM AB SERPL-ACNC: 11.2 SEC — SIGNIFICANT CHANGE UP (ref 9.8–13.1)
RBC # BLD: 4.43 M/UL — SIGNIFICANT CHANGE UP (ref 3.8–5.2)
RBC # FLD: 12.6 % — SIGNIFICANT CHANGE UP (ref 10.3–14.5)
RH IG SCN BLD-IMP: POSITIVE — SIGNIFICANT CHANGE UP
SODIUM SERPL-SCNC: 142 MMOL/L — SIGNIFICANT CHANGE UP (ref 135–145)
WBC # BLD: 9.4 K/UL — SIGNIFICANT CHANGE UP (ref 3.8–10.5)
WBC # FLD AUTO: 9.4 K/UL — SIGNIFICANT CHANGE UP (ref 3.8–10.5)

## 2017-09-13 PROCEDURE — 99232 SBSQ HOSP IP/OBS MODERATE 35: CPT | Mod: 25,GC

## 2017-09-13 PROCEDURE — 43264 ERCP REMOVE DUCT CALCULI: CPT | Mod: GC

## 2017-09-13 PROCEDURE — 43262 ENDO CHOLANGIOPANCREATOGRAPH: CPT | Mod: GC

## 2017-09-13 RX ORDER — HYDROMORPHONE HYDROCHLORIDE 2 MG/ML
0.5 INJECTION INTRAMUSCULAR; INTRAVENOUS; SUBCUTANEOUS ONCE
Qty: 0 | Refills: 0 | Status: DISCONTINUED | OUTPATIENT
Start: 2017-09-13 | End: 2017-09-13

## 2017-09-13 RX ORDER — MAGNESIUM SULFATE 500 MG/ML
1 VIAL (ML) INJECTION ONCE
Qty: 0 | Refills: 0 | Status: COMPLETED | OUTPATIENT
Start: 2017-09-13 | End: 2017-09-13

## 2017-09-13 RX ORDER — POTASSIUM PHOSPHATE, MONOBASIC POTASSIUM PHOSPHATE, DIBASIC 236; 224 MG/ML; MG/ML
15 INJECTION, SOLUTION INTRAVENOUS ONCE
Qty: 0 | Refills: 0 | Status: COMPLETED | OUTPATIENT
Start: 2017-09-13 | End: 2017-09-13

## 2017-09-13 RX ADMIN — Medication 100 GRAM(S): at 17:58

## 2017-09-13 RX ADMIN — Medication 100 GRAM(S): at 05:01

## 2017-09-13 RX ADMIN — Medication 100 GRAM(S): at 16:25

## 2017-09-13 RX ADMIN — HYDROMORPHONE HYDROCHLORIDE 0.5 MILLIGRAM(S): 2 INJECTION INTRAMUSCULAR; INTRAVENOUS; SUBCUTANEOUS at 17:23

## 2017-09-13 RX ADMIN — SODIUM CHLORIDE 150 MILLILITER(S): 9 INJECTION, SOLUTION INTRAVENOUS at 08:42

## 2017-09-13 RX ADMIN — HYDROMORPHONE HYDROCHLORIDE 0.5 MILLIGRAM(S): 2 INJECTION INTRAMUSCULAR; INTRAVENOUS; SUBCUTANEOUS at 17:08

## 2017-09-13 RX ADMIN — POTASSIUM PHOSPHATE, MONOBASIC POTASSIUM PHOSPHATE, DIBASIC 62.5 MILLIMOLE(S): 236; 224 INJECTION, SOLUTION INTRAVENOUS at 16:25

## 2017-09-13 RX ADMIN — PANTOPRAZOLE SODIUM 40 MILLIGRAM(S): 20 TABLET, DELAYED RELEASE ORAL at 16:25

## 2017-09-13 RX ADMIN — ENOXAPARIN SODIUM 40 MILLIGRAM(S): 100 INJECTION SUBCUTANEOUS at 16:25

## 2017-09-13 NOTE — CHART NOTE - NSCHARTNOTEFT_GEN_A_CORE
Patient s/p ercp under general anesthesia. No complications. Indomethacin given.     Findings:  -2cbd stones in distal bile duct on cholangiogram.  -Sphincterotomy performed and bile duct swept with balloon, removing 2 stones. No bleeding during procedure.    Recommend:  - Npo for possible cholecystectomy per surgery team  - IVF (lactated ringers)

## 2017-09-13 NOTE — PROGRESS NOTE ADULT - SUBJECTIVE AND OBJECTIVE BOX
SUBJECTIVE: Patient seen and examined at bedside with surgical team, patient without complaints this am    Vital Signs Last 24 Hrs  T(C): 37 (09-13-17 @ 09:28), Max: 37.2 (09-13-17 @ 04:57)  HR: 69 (09-13-17 @ 09:28) (64 - 78)  BP: 134/73 (09-13-17 @ 09:28) (119/61 - 134/73)  RR: 18 (09-13-17 @ 09:28) (17 - 18)  SpO2: 98% (09-13-17 @ 09:28) (98% - 100%)  Wt(kg): --  09-12 @ 07:01  -  09-13 @ 07:00  --------------------------------------------------------  IN:    IV PiggyBack: 100 mL    lactated ringers.: 3000 mL  Total IN: 3100 mL    OUT:    Voided: 3100 mL  Total OUT: 3100 mL    Total NET: 0 mL      09-13 @ 07:01  -  09-13 @ 13:47  --------------------------------------------------------  IN:    lactated ringers.: 600 mL  Total IN: 600 mL    OUT:    Voided: 700 mL  Total OUT: 700 mL    Total NET: -100 mL      MEDICATIONS  (STANDING):  enoxaparin Injectable 40 milliGRAM(s) SubCutaneous daily  pantoprazole  Injectable 40 milliGRAM(s) IV Push daily  cefoTEtan  IVPB      cefoTEtan  IVPB 2 Gram(s) IV Intermittent every 12 hours  lactated ringers. 1000 milliLiter(s) (150 mL/Hr) IV Continuous <Continuous>    MEDICATIONS  (PRN):    Physical Exam  General: A&Ox3, NAD  Abdominal: soft nontender, nd    LABS:                         13.3   9.40  )-----------( 176      ( 13 Sep 2017 05:37 )             38.8     09-13    142  |  104  |  6<L>  ----------------------------<  89  3.9   |  28  |  0.93    Ca    8.3<L>      13 Sep 2017 05:37  Phos  2.0     09-13  Mg     1.9     09-13    TPro  5.9<L>  /  Alb  2.8<L>  /  TBili  1.1  /  DBili  x   /  AST  39<H>  /  ALT  189<H>  /  AlkPhos  222<H>  09-13    PT/INR - ( 13 Sep 2017 05:37 )   PT: 11.2 SEC;   INR: 1.00          PTT - ( 13 Sep 2017 05:37 )  PTT:29.1 SEC          RADIOLOGY, EKG & ADDITIONAL TESTS: Reviewed.         Patient is a 77y Female with gallstone pancreatitis, without complaints this am. Found to have choledocholithiasis on MRCP, planning for ERCP and possible subsequent lap cholecystectomy  -NPO for procedures  -continue antibiotics  -continue DVT ppx

## 2017-09-14 ENCOUNTER — TRANSCRIPTION ENCOUNTER (OUTPATIENT)
Age: 77
End: 2017-09-14

## 2017-09-14 ENCOUNTER — RESULT REVIEW (OUTPATIENT)
Age: 77
End: 2017-09-14

## 2017-09-14 LAB
BUN SERPL-MCNC: 7 MG/DL — SIGNIFICANT CHANGE UP (ref 7–23)
CALCIUM SERPL-MCNC: 8.6 MG/DL — SIGNIFICANT CHANGE UP (ref 8.4–10.5)
CHLORIDE SERPL-SCNC: 101 MMOL/L — SIGNIFICANT CHANGE UP (ref 98–107)
CO2 SERPL-SCNC: 24 MMOL/L — SIGNIFICANT CHANGE UP (ref 22–31)
CREAT SERPL-MCNC: 0.75 MG/DL — SIGNIFICANT CHANGE UP (ref 0.5–1.3)
GLUCOSE SERPL-MCNC: 89 MG/DL — SIGNIFICANT CHANGE UP (ref 70–99)
HCT VFR BLD CALC: 36.9 % — SIGNIFICANT CHANGE UP (ref 34.5–45)
HGB BLD-MCNC: 12.8 G/DL — SIGNIFICANT CHANGE UP (ref 11.5–15.5)
MAGNESIUM SERPL-MCNC: 2 MG/DL — SIGNIFICANT CHANGE UP (ref 1.6–2.6)
MCHC RBC-ENTMCNC: 29.3 PG — SIGNIFICANT CHANGE UP (ref 27–34)
MCHC RBC-ENTMCNC: 34.7 % — SIGNIFICANT CHANGE UP (ref 32–36)
MCV RBC AUTO: 84.4 FL — SIGNIFICANT CHANGE UP (ref 80–100)
NRBC # FLD: 0 — SIGNIFICANT CHANGE UP
PHOSPHATE SERPL-MCNC: 3.5 MG/DL — SIGNIFICANT CHANGE UP (ref 2.5–4.5)
PLATELET # BLD AUTO: 194 K/UL — SIGNIFICANT CHANGE UP (ref 150–400)
PMV BLD: 10.9 FL — SIGNIFICANT CHANGE UP (ref 7–13)
POTASSIUM SERPL-MCNC: 3.7 MMOL/L — SIGNIFICANT CHANGE UP (ref 3.5–5.3)
POTASSIUM SERPL-SCNC: 3.7 MMOL/L — SIGNIFICANT CHANGE UP (ref 3.5–5.3)
RBC # BLD: 4.37 M/UL — SIGNIFICANT CHANGE UP (ref 3.8–5.2)
RBC # FLD: 12.2 % — SIGNIFICANT CHANGE UP (ref 10.3–14.5)
SODIUM SERPL-SCNC: 140 MMOL/L — SIGNIFICANT CHANGE UP (ref 135–145)
WBC # BLD: 6.89 K/UL — SIGNIFICANT CHANGE UP (ref 3.8–10.5)
WBC # FLD AUTO: 6.89 K/UL — SIGNIFICANT CHANGE UP (ref 3.8–10.5)

## 2017-09-14 PROCEDURE — 47562 LAPAROSCOPIC CHOLECYSTECTOMY: CPT | Mod: GC

## 2017-09-14 PROCEDURE — 88304 TISSUE EXAM BY PATHOLOGIST: CPT | Mod: 26

## 2017-09-14 RX ORDER — ONDANSETRON 8 MG/1
4 TABLET, FILM COATED ORAL ONCE
Qty: 0 | Refills: 0 | Status: DISCONTINUED | OUTPATIENT
Start: 2017-09-14 | End: 2017-09-14

## 2017-09-14 RX ORDER — IBUPROFEN 200 MG
600 TABLET ORAL EVERY 6 HOURS
Qty: 0 | Refills: 0 | Status: DISCONTINUED | OUTPATIENT
Start: 2017-09-14 | End: 2017-09-14

## 2017-09-14 RX ORDER — POTASSIUM CHLORIDE 20 MEQ
10 PACKET (EA) ORAL
Qty: 0 | Refills: 0 | Status: DISCONTINUED | OUTPATIENT
Start: 2017-09-14 | End: 2017-09-14

## 2017-09-14 RX ORDER — HYDROMORPHONE HYDROCHLORIDE 2 MG/ML
0.5 INJECTION INTRAMUSCULAR; INTRAVENOUS; SUBCUTANEOUS
Qty: 0 | Refills: 0 | Status: DISCONTINUED | OUTPATIENT
Start: 2017-09-14 | End: 2017-09-14

## 2017-09-14 RX ORDER — POTASSIUM CHLORIDE 20 MEQ
10 PACKET (EA) ORAL
Qty: 0 | Refills: 0 | Status: COMPLETED | OUTPATIENT
Start: 2017-09-14 | End: 2017-09-14

## 2017-09-14 RX ORDER — HYDROMORPHONE HYDROCHLORIDE 2 MG/ML
1 INJECTION INTRAMUSCULAR; INTRAVENOUS; SUBCUTANEOUS
Qty: 0 | Refills: 0 | Status: DISCONTINUED | OUTPATIENT
Start: 2017-09-14 | End: 2017-09-14

## 2017-09-14 RX ORDER — OXYCODONE AND ACETAMINOPHEN 5; 325 MG/1; MG/1
1 TABLET ORAL EVERY 4 HOURS
Qty: 0 | Refills: 0 | Status: DISCONTINUED | OUTPATIENT
Start: 2017-09-14 | End: 2017-09-14

## 2017-09-14 RX ADMIN — Medication 100 MILLIEQUIVALENT(S): at 13:51

## 2017-09-14 RX ADMIN — ENOXAPARIN SODIUM 40 MILLIGRAM(S): 100 INJECTION SUBCUTANEOUS at 13:30

## 2017-09-14 RX ADMIN — SODIUM CHLORIDE 75 MILLILITER(S): 9 INJECTION, SOLUTION INTRAVENOUS at 13:00

## 2017-09-14 RX ADMIN — Medication 100 MILLIEQUIVALENT(S): at 17:28

## 2017-09-14 RX ADMIN — Medication 100 MILLIEQUIVALENT(S): at 16:07

## 2017-09-14 RX ADMIN — SODIUM CHLORIDE 75 MILLILITER(S): 9 INJECTION, SOLUTION INTRAVENOUS at 16:08

## 2017-09-14 RX ADMIN — Medication 100 GRAM(S): at 05:12

## 2017-09-14 NOTE — BRIEF OPERATIVE NOTE - PROCEDURE
<<-----Click on this checkbox to enter Procedure Laparoscopic cholecystectomy  09/14/2017    Active  [id number]

## 2017-09-14 NOTE — PROGRESS NOTE ADULT - SUBJECTIVE AND OBJECTIVE BOX
B Team Progress Note. Please page #[phone] with any questions or concerns.    S: 78yo Woman seen and examined during morning rounds and postoperatively. No acute events overnight; afebrile, VS stable. Pain well controlled with current regimen.     O:  Vital Signs Last 24 Hrs  T(C): 36.9 (14 Sep 2017 17:22), Max: 36.9 (14 Sep 2017 17:22)  T(F): 98.5 (14 Sep 2017 17:22), Max: 98.5 (14 Sep 2017 17:22)  HR: 62 (14 Sep 2017 17:22) (56 - 77)  BP: 144/71 (14 Sep 2017 17:22) (109/61 - 174/88)  BP(mean): --  RR: 17 (14 Sep 2017 17:22) (12 - 23)  SpO2: 99% (14 Sep 2017 17:22) (95% - 100%)    I&O's Detail    13 Sep 2017 07:01  -  14 Sep 2017 07:00  --------------------------------------------------------  IN:    IV PiggyBack: 450 mL    lactated ringers.: 3600 mL    Oral Fluid: 120 mL  Total IN: 4170 mL    OUT:    Voided: 700 mL  Total OUT: 700 mL    Total NET: 3470 mL      14 Sep 2017 07:01  -  14 Sep 2017 18:32  --------------------------------------------------------  IN:    IV PiggyBack: 300 mL    lactated ringers.: 450 mL    Oral Fluid: 100 mL  Total IN: 850 mL    OUT:    Voided: 1400 mL  Total OUT: 1400 mL    Total NET: -550 mL    Physical Exam:  Gen: Resting in bed, NAD, alert and oriented.   Resp: airway patent, respirations unlabored, no increased WOB   Abd: soft, NT/ND, dressing on 4 laparoscopic incision sites    MEDICATIONS  (STANDING):  enoxaparin Injectable 40 milliGRAM(s) SubCutaneous daily  lactated ringers. 1000 milliLiter(s) (75 mL/Hr) IV Continuous <Continuous>    MEDICATIONS  (PRN):      LABS:                        12.8   6.89  )-----------( 194      ( 14 Sep 2017 05:54 )             36.9       09-14    140  |  101  |  7   ----------------------------<  89  3.7   |  24  |  0.75    Ca    8.6      14 Sep 2017 05:54  Phos  3.5     09-14  Mg     2.0     09-14    TPro  5.9<L>  /  Alb  2.8<L>  /  TBili  1.1  /  DBili  x   /  AST  39<H>  /  ALT  189<H>  /  AlkPhos  222<H>  09-13              IMAGING: B Team Progress Note. Please page #[phone] with any questions or concerns.    S: 78yo Woman seen and examined during morning rounds and postoperatively. No acute events overnight; afebrile, VS stable. Pain well controlled with current regimen.     O:  Vital Signs Last 24 Hrs  T(C): 36.9 (14 Sep 2017 17:22), Max: 36.9 (14 Sep 2017 17:22)  T(F): 98.5 (14 Sep 2017 17:22), Max: 98.5 (14 Sep 2017 17:22)  HR: 62 (14 Sep 2017 17:22) (56 - 77)  BP: 144/71 (14 Sep 2017 17:22) (109/61 - 174/88)  BP(mean): --  RR: 17 (14 Sep 2017 17:22) (12 - 23)  SpO2: 99% (14 Sep 2017 17:22) (95% - 100%)    I&O's Detail    13 Sep 2017 07:01  -  14 Sep 2017 07:00  --------------------------------------------------------  IN:    IV PiggyBack: 450 mL    lactated ringers.: 3600 mL    Oral Fluid: 120 mL  Total IN: 4170 mL    OUT:    Voided: 700 mL  Total OUT: 700 mL    Total NET: 3470 mL      14 Sep 2017 07:01  -  14 Sep 2017 18:32  --------------------------------------------------------  IN:    IV PiggyBack: 300 mL    lactated ringers.: 450 mL    Oral Fluid: 100 mL  Total IN: 850 mL    OUT:    Voided: 1400 mL  Total OUT: 1400 mL    Total NET: -550 mL    Physical Exam:  Gen: Resting in bed, NAD, alert and oriented.   Resp: airway patent, respirations unlabored, no increased WOB   Abd: soft, NT/ND, dressing on 4 laparoscopic incision sites    MEDICATIONS  (STANDING):  enoxaparin Injectable 40 milliGRAM(s) SubCutaneous daily  lactated ringers. 1000 milliLiter(s) (75 mL/Hr) IV Continuous <Continuous>    MEDICATIONS  (PRN):      LABS:                        12.8   6.89  )-----------( 194      ( 14 Sep 2017 05:54 )             36.9       09-14    140  |  101  |  7   ----------------------------<  89  3.7   |  24  |  0.75    Ca    8.6      14 Sep 2017 05:54  Phos  3.5     09-14  Mg     2.0     09-14    TPro  5.9<L>  /  Alb  2.8<L>  /  TBili  1.1  /  DBili  x   /  AST  39<H>  /  ALT  189<H>  /  AlkPhos  222<H>  09-13

## 2017-09-14 NOTE — BRIEF OPERATIVE NOTE - OPERATION/FINDINGS
Large stone noted at gallbladder neck. Cystic duct and artery identified with critical view of safety achieved. Clips placed on artery, hem-o-loks placed on duct. entire gallbladder removed with no spillage.

## 2017-09-14 NOTE — PROGRESS NOTE ADULT - ASSESSMENT
Impression:  1. Abdominal pain - secondary to gallstone pancreatitis/ choledocholithiasis +/- cholecystitis  2. Choledocholithiasis - s/p removal via ERCP    Recommend:  -Patient tolerated ERCP well, choledocholithaisis now removed  -Monitor AM labs  -Cholecystectomy as per surgery given multiple bouts of gallstone attacks  -Plan as per surgical team; please call with questions
A: Patient is a 77y old Woman s/p Laparoscopic cholecystectomy for Acute pancreatitis without infection or necrosis.    P:  - Pain control  - Dispo planning:    -Diet: Advance to regular    -Activity: OOB, advance as tolerated.    -Discharge to home when stable and tolerating diet

## 2017-09-14 NOTE — PROGRESS NOTE ADULT - SUBJECTIVE AND OBJECTIVE BOX
ADVANCED ENDOSCOPY PROGRESS NOTE      Chief Complaint:  Patient is a 77y old  Female who presents with a chief complaint of Gallstone Pancreatitis (10 Sep 2017 23:23)      Interval Events: Patient denies any abdominal pain or nausea/vomiting. She has not started a diet yet since ERCP. No fevers/chills.    Allergies:  No Known Allergies      Hospital Medications:  enoxaparin Injectable 40 milliGRAM(s) SubCutaneous daily  pantoprazole  Injectable 40 milliGRAM(s) IV Push daily  cefoTEtan  IVPB      cefoTEtan  IVPB 2 Gram(s) IV Intermittent every 12 hours  lactated ringers. 1000 milliLiter(s) IV Continuous <Continuous>      PMHX/PSHX:  No pertinent past medical history  No significant past surgical history      Family history:  No pertinent family history in first degree relatives      ROS:     General:  No wt loss, fevers, chills, night sweats, fatigue,   Eyes:  Good vision, no reported pain  ENT:  No sore throat, pain, runny nose, dysphagia  CV:  No pain, palpitations, hypo/hypertension  Resp:  No dyspnea, cough, tachypnea, wheezing  GI:  See HPI  :  No pain, bleeding, incontinence, nocturia  Muscle:  No pain, weakness  Neuro:  No weakness, tingling, memory problems  Skin:  No rash, edema      PHYSICAL EXAM:   Vital Signs:  Vital Signs Last 24 Hrs  T(C): 36.5 (14 Sep 2017 05:09), Max: 37 (13 Sep 2017 09:28)  T(F): 97.7 (14 Sep 2017 05:09), Max: 98.6 (13 Sep 2017 09:28)  HR: 56 (14 Sep 2017 05:09) (56 - 69)  BP: 129/83 (14 Sep 2017 05:09) (129/83 - 182/90)  BP(mean): --  RR: 16 (14 Sep 2017 05:09) (16 - 18)  SpO2: 96% (14 Sep 2017 05:09) (96% - 100%)  Daily     Daily     GENERAL:  Appears stated age, well-groomed, well-nourished, no distress  HEENT:  NC/AT,  conjunctivae clear, sclera -anicteric  CHEST:  Full & symmetric excursion, no increased effort, breath sounds clear  HEART:  Regular rhythm, S1, S2, no murmur/rub/S3/S4,  no edema  ABDOMEN:  Soft, non-tender, non-distended, normoactive bowel sounds,  no masses  EXTREMITIES:  no cyanosis,clubbing or edema  SKIN:  No rash/erythema  NEURO:  Alert, oriented x 3    LABS:                        12.8   6.89  )-----------( 194      ( 14 Sep 2017 05:54 )             36.9     09-14    140  |  101  |  7   ----------------------------<  89  3.7   |  24  |  0.75    Ca    8.6      14 Sep 2017 05:54  Phos  3.5     09-14  Mg     2.0     09-14    TPro  5.9<L>  /  Alb  2.8<L>  /  TBili  1.1  /  DBili  x   /  AST  39<H>  /  ALT  189<H>  /  AlkPhos  222<H>  09-13    LIVER FUNCTIONS - ( 13 Sep 2017 05:37 )  Alb: 2.8 g/dL / Pro: 5.9 g/dL / ALK PHOS: 222 u/L / ALT: 189 u/L / AST: 39 u/L / GGT: x           PT/INR - ( 13 Sep 2017 05:37 )   PT: 11.2 SEC;   INR: 1.00          PTT - ( 13 Sep 2017 05:37 )  PTT:29.1 SEC        Imaging:    < from: ERCP (09.13.17 @ 11:53) >    [facility]  _______________________________________________________________________________  Patient Name: Ira Boneta          Procedure Date: 9/13/2017 11:53 AM  MRN: [id number]                     Account Number: [id number]  YOB: 1940               Admit Type: Inpatient  Room: Cath Lab 1                      Gender: Female  Attending MD: Zurdo Grant MD       _______________________________________________________________________________     Procedure:           ERCP  Indications:         For therapy of bile duct stone(s); choledocholithiasis                        seen on MRCP  Providers:           Zurdo Grant MD, LINDA HUSSEIN MD (Fellow)  Referring MD:        JAY MORRISON MD  Medicines:           General Anesthesia; Indomethacin 100mg NM x 1; patent                        already on Cefotetan.  Complications:       No immediate complications.  Procedure:           Pre-Anesthesia Assessment:                       - Prior to the procedure, a History and Physical was                        performed, and patient medications, allergies and                        sensitivities were reviewed. The patient's tolerance of                        previous anesthesia was reviewed.                      - The risks and benefits of the procedure and the                        sedation options and risks were discussed with the                        patient. All questions were answered and informed                        consent was obtained.                       - Mental Status Examination: alert and oriented. Airway                        Examination: normal oropharyngeal airway and neck                        mobility. Respiratory Examination: clear to    auscultation. CV Examination: normal. Abdominal                        Examination: bowel sounds present, abdomen soft and                        non-tender, no masses or organomegaly noted.                       - ASA Grade Assessment: II - A patient with mild                        systemic disease.                       - Sedation was administered by an anesthesia                        professional. General anesthesia was attained.                       - The heart rate, respiratory rate, oxygen saturations,                        blood pressure, adequacy of pulmonary ventilation, and                        response to care were monitored throughout the procedure.                       - The physical status of the patient was re-assessed                       after the procedure.                       After obtaining informed consent, the scope was passed                        under direct vision. Throughout the procedure, the                        patient's blood pressure, pulse,and oxygen saturations                        were monitored continuously. The Colonoscope was                        introduced through the mouth, and advanced to the                        duodenum and used to cannulate the bile duct. The ERCP                     was accomplished without difficulty. The patient                        tolerated the procedure well.                                                                                   Findings:       The upper GI tract was traversedunder direct vision without detailed        examination. The major papilla was normal. The bile duct was deeply        cannulated with the Hydratome RX 44 0.035in x 260cm via the preloaded        wire. Contrast was injected. I personally interpreted the bile duct        images. There was brisk flow of contrast through the ducts. Image        quality was excellent. Contrast extended to the bifurcation. The lower        third of the main bile duct contained two stones mm. The in the biliary        system was mildly dilated. Biliary sphincterotomy was made in the usual        fashion with a Hydratome using ERBE electrocautery. There was no        post-sphincterotomy bleeding. The biliary tree was swept with a 9 mm        balloon and 12 mm balloon starting at the upper third of the main bile        duct. Two stones were removed. No stones remained. Occlusion        cholangiogram was peformed and showed no residual stones in the bile        ducts                                    Impression:          - The biliary system was mildly dilated.                       - A sphincterotomy was performed.                       - The biliary tree was swept.                       - Choledocholithiasis was found. Complete removal was                        accomplished by biliary sphincterotomy and balloon                        extraction.  Recommendation:      - Return patient to hospital so for ongoing care.                       - NPO for possible cholecystectomy today per primary                        team.                       - Lactate ringers x 1 liter bolus, then at 125cc/hr for                        the rest of today.                  Attending Participation:       I personally performed the entire procedure.                                                                                     __________________  Zurdo Grant MD  9/14/2017 7:28:22 AM  This report has been signed electronically.  Number of Addenda: 0    Note Initiated On: 9/13/2017 11:53 AM    < end of copied text >

## 2017-09-15 ENCOUNTER — TRANSCRIPTION ENCOUNTER (OUTPATIENT)
Age: 77
End: 2017-09-15

## 2017-09-15 VITALS
DIASTOLIC BLOOD PRESSURE: 69 MMHG | SYSTOLIC BLOOD PRESSURE: 116 MMHG | RESPIRATION RATE: 16 BRPM | OXYGEN SATURATION: 98 % | TEMPERATURE: 98 F | HEART RATE: 74 BPM

## 2017-09-15 RX ORDER — OXYCODONE AND ACETAMINOPHEN 5; 325 MG/1; MG/1
1 TABLET ORAL EVERY 6 HOURS
Qty: 0 | Refills: 0 | Status: DISCONTINUED | OUTPATIENT
Start: 2017-09-15 | End: 2017-09-15

## 2017-09-15 RX ORDER — OXYCODONE AND ACETAMINOPHEN 5; 325 MG/1; MG/1
2 TABLET ORAL EVERY 6 HOURS
Qty: 0 | Refills: 0 | Status: DISCONTINUED | OUTPATIENT
Start: 2017-09-15 | End: 2017-09-15

## 2017-09-15 RX ORDER — ACETAMINOPHEN 500 MG
2 TABLET ORAL
Qty: 0 | Refills: 0 | DISCHARGE
Start: 2017-09-15

## 2017-09-15 RX ORDER — ACETAMINOPHEN 500 MG
650 TABLET ORAL EVERY 6 HOURS
Qty: 0 | Refills: 0 | Status: DISCONTINUED | OUTPATIENT
Start: 2017-09-15 | End: 2017-09-15

## 2017-09-15 RX ADMIN — OXYCODONE AND ACETAMINOPHEN 1 TABLET(S): 5; 325 TABLET ORAL at 07:46

## 2017-09-15 RX ADMIN — OXYCODONE AND ACETAMINOPHEN 1 TABLET(S): 5; 325 TABLET ORAL at 07:16

## 2017-09-15 NOTE — DISCHARGE NOTE ADULT - CARE PROVIDER_API CALL
Rayo Lora), DieticianNutrition; Surgery; Surgical Critical Care  [address]  [address] [address]  Phone: [phone]  Fax: [phone]

## 2017-09-15 NOTE — DISCHARGE NOTE ADULT - INSTRUCTIONS
The patient may resume a regular diet. Watch for signs of infection; redness, swelling, fever, chills or heat, report such symptoms to the MD. No driving while taking pain medication, it causes drowsiness & constipation. Drink 6-8 glasses of fluids daily to promote hydration. No heavy lifting, pulling or pushing heavy objects. Follow up with the MD

## 2017-09-15 NOTE — PROGRESS NOTE ADULT - SUBJECTIVE AND OBJECTIVE BOX
ANESTHESIA POSTOP CHECK    77y Female POSTOP DAY 1 S/P laparoscopic cholecystectomy     Vital Signs Last 24 Hrs  T(C): 37.1 (15 Sep 2017 05:44), Max: 37.1 (15 Sep 2017 05:44)  T(F): 98.7 (15 Sep 2017 05:44), Max: 98.7 (15 Sep 2017 05:44)  HR: 63 (15 Sep 2017 05:44) (62 - 77)  BP: 136/77 (15 Sep 2017 05:44) (109/61 - 151/77)  BP(mean): --  RR: 17 (15 Sep 2017 05:44) (12 - 23)  SpO2: 98% (15 Sep 2017 05:44) (95% - 100%)  I&O's Summary    14 Sep 2017 07:01  -  15 Sep 2017 07:00  --------------------------------------------------------  IN: 1120 mL / OUT: 1900 mL / NET: -780 mL        [x ] NO APPARENT ANESTHESIA COMPLICATIONS

## 2017-09-15 NOTE — DISCHARGE NOTE ADULT - PLAN OF CARE
s/p ERCP with sphincterotomy and stone retrieval, s/p laparoscopic cholecystectomy Leave steri-strips in place, and allow to fall off on their own.  Do not lift greater than 45 lbs for 6 weeks.  May return to work or daily activities.  Take Tylenol for pain, if pain persists then use Percocet medication as directed.

## 2017-09-15 NOTE — DISCHARGE NOTE ADULT - MEDICATION SUMMARY - MEDICATIONS TO TAKE
I will START or STAY ON the medications listed below when I get home from the hospital:    acetaminophen 325 mg oral tablet  -- 2 tab(s) by mouth every 6 hours, As needed, Mild Pain (1 - 3)  -- Indication: For Pain    oxyCODONE-acetaminophen 5 mg-325 mg oral tablet  -- 1 tab(s) by mouth every 6 hours, As needed, Moderate Pain to Severe Pain (4 - 10) MDD:4 tabs   -- Indication: For Persistent pain    docusate sodium 50 mg oral capsule  -- 1 cap(s) by mouth 2 times a day, As Needed for constipation  -- Indication: For Stool softener

## 2017-09-15 NOTE — DISCHARGE NOTE ADULT - ADDITIONAL INSTRUCTIONS
Please follow up with Dr. Lora within 1-2 weeks after discharge from the hospital. You may call [phone] to schedule an appointment.    Please also follow up with your primary care physician.

## 2017-09-15 NOTE — DISCHARGE NOTE ADULT - CARE PLAN
Principal Discharge DX:	Gallstone pancreatitis  Goal:	s/p ERCP with sphincterotomy and stone retrieval, s/p laparoscopic cholecystectomy  Instructions for follow-up, activity and diet:	Leave steri-strips in place, and allow to fall off on their own.  Do not lift greater than 45 lbs for 6 weeks.  May return to work or daily activities.  Take Tylenol for pain, if pain persists then use Percocet medication as directed.

## 2017-09-15 NOTE — DISCHARGE NOTE ADULT - HOSPITAL COURSE
77F admitted with gallstone pancreatitis. During her hospital stay she had an MRCP which showed the presence of choledocholithiasis, an ERCP was schedule for the following day and a sphincterotomy and stone retrieval was performed. The following day the patient was treated with a laparoscopic cholecystectomy. She tolerated the procedure well and on hospital day 5, post-operative day 1, the patient and the team felt comfortable with a discharge home. At this time she was tolerating a regular diet without nausea or vomiting and pain was controlled with oral medications.

## 2017-09-15 NOTE — DISCHARGE NOTE ADULT - PATIENT PORTAL LINK FT
“You can access the [facility] Patient Portal, offered by [facility], by registering with the following website: [email or web address]”

## 2017-09-16 ENCOUNTER — TRANSCRIPTION ENCOUNTER (OUTPATIENT)
Age: 77
End: 2017-09-16

## 2017-09-25 ENCOUNTER — APPOINTMENT (OUTPATIENT)
Dept: TRAUMA SURGERY | Facility: CLINIC | Age: 77
End: 2017-09-25
Payer: MEDICARE

## 2017-09-25 VITALS
HEIGHT: 64 IN | SYSTOLIC BLOOD PRESSURE: 148 MMHG | WEIGHT: 130 LBS | BODY MASS INDEX: 22.2 KG/M2 | HEART RATE: 64 BPM | TEMPERATURE: 97.6 F | DIASTOLIC BLOOD PRESSURE: 72 MMHG

## 2017-09-25 DIAGNOSIS — Z87.19 PERSONAL HISTORY OF OTHER DISEASES OF THE DIGESTIVE SYSTEM: ICD-10-CM

## 2017-09-25 DIAGNOSIS — Z09 ENCOUNTER FOR FOLLOW-UP EXAMINATION AFTER COMPLETED TREATMENT FOR CONDITIONS OTHER THAN MALIGNANT NEOPLASM: ICD-10-CM

## 2017-09-25 PROBLEM — Z00.00 ENCOUNTER FOR PREVENTIVE HEALTH EXAMINATION: Status: ACTIVE | Noted: 2017-09-25

## 2017-09-25 PROCEDURE — 99024 POSTOP FOLLOW-UP VISIT: CPT

## 2017-11-02 ENCOUNTER — APPOINTMENT (OUTPATIENT)
Dept: VASCULAR SURGERY | Facility: CLINIC | Age: 77
End: 2017-11-02
Payer: MEDICARE

## 2017-11-02 VITALS
HEART RATE: 66 BPM | TEMPERATURE: 98.1 F | HEIGHT: 64 IN | DIASTOLIC BLOOD PRESSURE: 76 MMHG | SYSTOLIC BLOOD PRESSURE: 128 MMHG | BODY MASS INDEX: 22.71 KG/M2 | WEIGHT: 133 LBS

## 2017-11-02 DIAGNOSIS — I83.899 VARICOSE VEINS OF UNSPECIFIED LOWER EXTREMITY WITH OTHER COMPLICATIONS: ICD-10-CM

## 2017-11-02 PROCEDURE — 93970 EXTREMITY STUDY: CPT

## 2017-11-02 PROCEDURE — 99203 OFFICE O/P NEW LOW 30 MIN: CPT | Mod: 25

## 2019-05-06 NOTE — H&P ADULT - ASSESSMENT
Let her know she failed her glucola.  Please schedule 3 hour  
Pt informed, expressed understanding  
This patient is assessed as being a 77-year-old previously healthy female who has complaints of having right upper quadrant abdominal pain, transaminitis, and hyperlipasemia consistent Gallstone pancreatitis complicated by Acute Cholecystitis.

## 2020-08-20 PROBLEM — Z87.19 HISTORY OF CALCULUS OF GALLBLADDER: Status: RESOLVED | Noted: 2017-09-25 | Resolved: 2020-08-20

## 2020-09-21 NOTE — DISCHARGE NOTE ADULT - THE PATIENT HAS
Heart Failure Clinic  Date: 9/21/2020  PCP: Xiang Zavala MD  Cardiologist: BHUPINDER Perez T.  Nephrologist:   Discharge date: July 24, 2020  NYHA class: III  Weight at last visit: 74.9 kg (164.78 pounds)  Weight today:  72.7 kg (159.94 pounds)    History Of Present Illness:  Joel is a 64 year old male presenting with  HFrEF 20 % per recent echo done at [facility]. He had an MI at a very young in his 30s and had a 3 V bypass 12 years ago. He did well overall and has been very active. However a recent echo showed declining EF from 35% to 20%. A Stress test shows large fixed perfusion defect. He denied chest pain, pressure, SOB, KHAN, orthopnea, PND, LE edema, palpitations at last visit.  He was admitted to [facility] overnight 7/24 with decompensated HF. His weight is unchanged since last visit when we decreased his torsemide and states he is feeling  better each visit. At last visit we had patient take 10 mg torsemide for 2 days.  After that he states he was able to walk up a whole flight of stairs without dyspnea . He denies any chest pain, dyspnea, bendopnea or orthopnea. He is doing well. His weight is down 5 pounds since last week.    Impression:  1. Acute systolic congestive heart failure (CMS/HCC)    2. Dyspnea on exertion    3. Hypotension, unspecified hypotension type    4. Ischemic cardiomyopathy         Recommendations:  CMP and ProBNP here today   He will increase his entresto to 24/26 mg twice a day.  He will return to HF clinic next week. Repeat labs at that time.   He will have a CPX stress test and right heart cath with Dr. Perez in the next few weeks.  ECG to be done today.  He is starting cardiac rehab today.  He would benefit from a meeting with the dietician as he said it is challenging to keep with an appropriate diet between his Chron's disease and heart failure. Order sent to dietician.  His insurance denied cardioMEMS for patient. I sent an appeal last week. Awaiting response from appeal.  He will  continue weighing himself daily and will call Dr. Perez's office if his weight goes up 3 pounds in a day or 5 pounds in a week.  Call the Heart Failure Clinic with any questions or concerns.      Sherice Chung CNP  Heart Failure Nurse Practitioner    SUBJECTIVE:  Chief Complaint   Patient presents with   • Congestive Heart Failure     chf clinic follow up   • Follow-up   • Hypotension         ALLERGIES:  Cat dander and Sulfa antibiotics  Current Outpatient Medications   Medication Sig Dispense Refill   • sacubitril-valsartan (Entresto) 24-26 MG per tablet Take 1 tablet by mouth 2 times daily. 0.5 tab every morning and 1 tab every evening 90 tablet 3   • torsemide (DEMADEX) 20 MG tablet Take 10 mg by mouth as needed. Take as needed/ as instructed by HF clinic     • spironolactone (ALDACTONE) 25 MG tablet Take 0.5 tablets by mouth daily. 30 tablet 3   • metoPROLOL succinate (TOPROL-XL) 50 MG 24 hr tablet Take 1 tablet by mouth 2 times daily.     • AMIODarone (PACERONE) 200 MG tablet Take 1 tablet by mouth daily. 90 tablet 3   • azaTHIOprine (IMURAN) 50 MG tablet Take 100 mg by mouth daily.      • HUMIRA PEN-CD/UC/HS STARTER 40 MG/0.8ML pen-injector kit Inject 40 mg into the skin every 14 days.      • simvastatin (ZOCOR) 20 MG tablet TAKE 1 TABLET BY MOUTH EVERYDAY AT BEDTIME 90 tablet 1   • ELIQUIS 5 MG Tab TAKE 1 TABLET BY MOUTH TWICE A  tablet 2   • cyanocobalamin (VITAMIN B-12) 100 MCG tablet Take 100 mcg by mouth daily.      • Cholecalciferol (VITAMIN D) 2000 units capsule Take 1 capsule by mouth daily.        No current facility-administered medications for this visit.       Social History     Tobacco Use   • Smoking status: Former Smoker     Packs/day: 0.00     Types: Cigarettes   • Smokeless tobacco: Never Used   • Tobacco comment:  Former tobacco use. used to smoke but quit   Substance Use Topics   • Alcohol use: Yes     Frequency: 2-4 times a month     Drinks per session: 1 or 2     Comment:  weekends 2-3 drinks   • Drug use: Not Currently     Types: Marijuana     Past Medical History:   Diagnosis Date   • Atrial fibrillation (CMS/HCC)    • CHF (congestive heart failure) (CMS/HCC)    • Condition not found      peripheral embolic ischemic disease   • Crohn's disease (CMS/HCC)    • Dyslipidemia    • Myocardial infarction (CMS/HCC)    • Transient ischemic attack     2015   • Vitamin D deficiency       Past Surgical History:   Procedure Laterality Date   • Cardiac device check - in clinic      ([facility]) 8/10/10   • Coronary artery bypass graft       3 vessel CABG 2010   • Other surgical history      LV aneurysmectomy   • Ptca      1986   • Thrombectomy      LV thrombectomy       OBJECTIVE:  Visit Vitals  BP 98/62 (BP Location: RUE - Right upper extremity, Patient Position: Sitting, Cuff Size: Regular)   Pulse 61   Wt 72.7 kg (160 lb 4.4 oz)   BMI 23.67 kg/m²     Physical Exam  Vitals signs reviewed.   Constitutional:       Appearance: Normal appearance.   HENT:      Head: Normocephalic.      Mouth/Throat:      Mouth: Mucous membranes are moist.   Cardiovascular:      Rate and Rhythm: Normal rate. Rhythm irregular.   Pulmonary:      Breath sounds: Normal breath sounds. No wheezing.   Abdominal:      Palpations: Abdomen is soft.   Musculoskeletal:      Right lower leg: No edema.      Left lower leg: No edema.   Skin:     General: Skin is warm and dry.   Neurological:      Mental Status: He is alert and oriented to person, place, and time.   Psychiatric:         Behavior: Behavior normal.         Results Review:  Office Visit on 09/21/2020   Component Date Value Ref Range Status   • Sodium 09/21/2020 143  135 - 145 mmol/L Final   • Potassium 09/21/2020 4.3  3.4 - 5.1 mmol/L Final   • Chloride 09/21/2020 111* 98 - 107 mmol/L Final   • Carbon Dioxide 09/21/2020 27  21 - 32 mmol/L Final   • Anion Gap 09/21/2020 9* 10 - 20 mmol/L Final   • Glucose 09/21/2020 74  65 - 99 mg/dL Final   • BUN 09/21/2020 19  6 - 20  mg/dL Final   • Creatinine 09/21/2020 1.26* 0.67 - 1.17 mg/dL Final   • GFR Estimate,  09/21/2020 69   Final    eGFR 60 - 89 mL/min/1.73m2 = Mild decrease in kidney function.   • GFR Estimate, Non  09/21/2020 60   Final    eGFR 60 - 89 mL/min/1.73m2 = Mild decrease in kidney function.   • BUN/Creatinine Ratio 09/21/2020 15  7 - 25 Final   • CALCIUM 09/21/2020 9.0  8.4 - 10.2 mg/dL Final   • TOTAL BILIRUBIN 09/21/2020 0.4  0.2 - 1.0 mg/dL Final   • AST/SGOT 09/21/2020 20  <38 Units/L Final   • ALT/SGPT 09/21/2020 23  <64 Units/L Final   • ALK PHOSPHATASE 09/21/2020 32* 45 - 117 Units/L Final    Low Alkaline Phosphatase results may indicate hypophosphatasia, malnutrition, hypothyroidism, or other disease states. Correlate with clinical symptoms.   • TOTAL PROTEIN 09/21/2020 6.7  6.4 - 8.2 g/dL Final   • Albumin 09/21/2020 3.5* 3.6 - 5.1 g/dL Final   • GLOBULIN 09/21/2020 3.2  2.0 - 4.0 g/dL Final   • A/G Ratio, Serum 09/21/2020 1.1  1.0 - 2.4 Final   • NT proBNP 09/21/2020 3,581* <126 pg/mL Final       Education Encounter:  Discussed self care strategies, disease process, low sodium diet, and cardioMEMS, with patient.   no difficulties

## 2021-07-20 ENCOUNTER — INPATIENT (INPATIENT)
Facility: HOSPITAL | Age: 81
LOS: 3 days | Discharge: HOME | End: 2021-07-24
Attending: INTERNAL MEDICINE | Admitting: INTERNAL MEDICINE
Payer: MEDICARE

## 2021-07-20 VITALS
HEIGHT: 65 IN | DIASTOLIC BLOOD PRESSURE: 89 MMHG | RESPIRATION RATE: 20 BRPM | OXYGEN SATURATION: 100 % | WEIGHT: 142.86 LBS | HEART RATE: 72 BPM | TEMPERATURE: 98 F | SYSTOLIC BLOOD PRESSURE: 177 MMHG

## 2021-07-20 DIAGNOSIS — R26.81 UNSTEADINESS ON FEET: ICD-10-CM

## 2021-07-20 DIAGNOSIS — E87.1 HYPO-OSMOLALITY AND HYPONATREMIA: ICD-10-CM

## 2021-07-20 DIAGNOSIS — Z90.49 ACQUIRED ABSENCE OF OTHER SPECIFIED PARTS OF DIGESTIVE TRACT: Chronic | ICD-10-CM

## 2021-07-20 LAB
ALBUMIN SERPL ELPH-MCNC: 4.1 G/DL — SIGNIFICANT CHANGE UP (ref 3.3–5)
ALP SERPL-CCNC: 89 U/L — SIGNIFICANT CHANGE UP (ref 40–120)
ALT FLD-CCNC: 17 U/L — SIGNIFICANT CHANGE UP (ref 4–33)
ANION GAP SERPL CALC-SCNC: 13 MMOL/L — SIGNIFICANT CHANGE UP (ref 7–14)
APTT BLD: 28.2 SEC — SIGNIFICANT CHANGE UP (ref 27–36.3)
AST SERPL-CCNC: 17 U/L — SIGNIFICANT CHANGE UP (ref 4–32)
BASE EXCESS BLDV CALC-SCNC: 0.3 MMOL/L — SIGNIFICANT CHANGE UP (ref -3–2)
BASOPHILS # BLD AUTO: 0.01 K/UL — SIGNIFICANT CHANGE UP (ref 0–0.2)
BASOPHILS NFR BLD AUTO: 0.1 % — SIGNIFICANT CHANGE UP (ref 0–2)
BILIRUB SERPL-MCNC: 0.4 MG/DL — SIGNIFICANT CHANGE UP (ref 0.2–1.2)
BUN SERPL-MCNC: 15 MG/DL — SIGNIFICANT CHANGE UP (ref 7–23)
CALCIUM SERPL-MCNC: 8.8 MG/DL — SIGNIFICANT CHANGE UP (ref 8.4–10.5)
CHLORIDE SERPL-SCNC: 97 MMOL/L — LOW (ref 98–107)
CO2 SERPL-SCNC: 21 MMOL/L — LOW (ref 22–31)
CREAT SERPL-MCNC: 0.83 MG/DL — SIGNIFICANT CHANGE UP (ref 0.5–1.3)
EOSINOPHIL # BLD AUTO: 0.03 K/UL — SIGNIFICANT CHANGE UP (ref 0–0.5)
EOSINOPHIL NFR BLD AUTO: 0.4 % — SIGNIFICANT CHANGE UP (ref 0–6)
GLUCOSE BLDC GLUCOMTR-MCNC: 100 MG/DL — HIGH (ref 70–99)
GLUCOSE SERPL-MCNC: 183 MG/DL — HIGH (ref 70–99)
HCO3 BLDV-SCNC: 23 MMOL/L — SIGNIFICANT CHANGE UP (ref 20–27)
HCT VFR BLD CALC: 43.4 % — SIGNIFICANT CHANGE UP (ref 34.5–45)
HGB BLD-MCNC: 14.8 G/DL — SIGNIFICANT CHANGE UP (ref 11.5–15.5)
IANC: 5.8 K/UL — SIGNIFICANT CHANGE UP (ref 1.5–8.5)
IMM GRANULOCYTES NFR BLD AUTO: 0.4 % — SIGNIFICANT CHANGE UP (ref 0–1.5)
INR BLD: 1.02 RATIO — SIGNIFICANT CHANGE UP (ref 0.88–1.16)
LYMPHOCYTES # BLD AUTO: 1.13 K/UL — SIGNIFICANT CHANGE UP (ref 1–3.3)
LYMPHOCYTES # BLD AUTO: 15.6 % — SIGNIFICANT CHANGE UP (ref 13–44)
MCHC RBC-ENTMCNC: 29.7 PG — SIGNIFICANT CHANGE UP (ref 27–34)
MCHC RBC-ENTMCNC: 34.1 GM/DL — SIGNIFICANT CHANGE UP (ref 32–36)
MCV RBC AUTO: 87 FL — SIGNIFICANT CHANGE UP (ref 80–100)
MONOCYTES # BLD AUTO: 0.23 K/UL — SIGNIFICANT CHANGE UP (ref 0–0.9)
MONOCYTES NFR BLD AUTO: 3.2 % — SIGNIFICANT CHANGE UP (ref 2–14)
NEUTROPHILS # BLD AUTO: 5.8 K/UL — SIGNIFICANT CHANGE UP (ref 1.8–7.4)
NEUTROPHILS NFR BLD AUTO: 80.3 % — HIGH (ref 43–77)
NRBC # BLD: 0 /100 WBCS — SIGNIFICANT CHANGE UP
NRBC # FLD: 0 K/UL — SIGNIFICANT CHANGE UP
PCO2 BLDV: 44 MMHG — SIGNIFICANT CHANGE UP (ref 41–51)
PH BLDV: 7.37 — SIGNIFICANT CHANGE UP (ref 7.32–7.43)
PLATELET # BLD AUTO: 215 K/UL — SIGNIFICANT CHANGE UP (ref 150–400)
PO2 BLDV: 26 MMHG — LOW (ref 35–40)
POTASSIUM SERPL-MCNC: 4 MMOL/L — SIGNIFICANT CHANGE UP (ref 3.5–5.3)
POTASSIUM SERPL-SCNC: 4 MMOL/L — SIGNIFICANT CHANGE UP (ref 3.5–5.3)
PROT SERPL-MCNC: 7 G/DL — SIGNIFICANT CHANGE UP (ref 6–8.3)
PROTHROM AB SERPL-ACNC: 11.6 SEC — SIGNIFICANT CHANGE UP (ref 10.6–13.6)
RBC # BLD: 4.99 M/UL — SIGNIFICANT CHANGE UP (ref 3.8–5.2)
RBC # FLD: 12.1 % — SIGNIFICANT CHANGE UP (ref 10.3–14.5)
SAO2 % BLDV: 49 % — LOW (ref 60–85)
SARS-COV-2 RNA SPEC QL NAA+PROBE: SIGNIFICANT CHANGE UP
SODIUM SERPL-SCNC: 131 MMOL/L — LOW (ref 135–145)
TROPONIN T, HIGH SENSITIVITY RESULT: <6 NG/L — SIGNIFICANT CHANGE UP
WBC # BLD: 7.23 K/UL — SIGNIFICANT CHANGE UP (ref 3.8–10.5)
WBC # FLD AUTO: 7.23 K/UL — SIGNIFICANT CHANGE UP (ref 3.8–10.5)

## 2021-07-20 PROCEDURE — 70496 CT ANGIOGRAPHY HEAD: CPT | Mod: 26

## 2021-07-20 PROCEDURE — 70450 CT HEAD/BRAIN W/O DYE: CPT | Mod: 26

## 2021-07-20 PROCEDURE — 99285 EMERGENCY DEPT VISIT HI MDM: CPT

## 2021-07-20 PROCEDURE — 70498 CT ANGIOGRAPHY NECK: CPT | Mod: 26

## 2021-07-20 PROCEDURE — 99223 1ST HOSP IP/OBS HIGH 75: CPT

## 2021-07-20 RX ORDER — ASPIRIN/CALCIUM CARB/MAGNESIUM 324 MG
81 TABLET ORAL ONCE
Refills: 0 | Status: COMPLETED | OUTPATIENT
Start: 2021-07-20 | End: 2021-07-20

## 2021-07-20 RX ORDER — CLOPIDOGREL BISULFATE 75 MG/1
75 TABLET, FILM COATED ORAL DAILY
Refills: 0 | Status: DISCONTINUED | OUTPATIENT
Start: 2021-07-21 | End: 2021-07-24

## 2021-07-20 RX ORDER — GLUCAGON INJECTION, SOLUTION 0.5 MG/.1ML
1 INJECTION, SOLUTION SUBCUTANEOUS ONCE
Refills: 0 | Status: DISCONTINUED | OUTPATIENT
Start: 2021-07-20 | End: 2021-07-21

## 2021-07-20 RX ORDER — DEXTROSE 50 % IN WATER 50 %
15 SYRINGE (ML) INTRAVENOUS ONCE
Refills: 0 | Status: DISCONTINUED | OUTPATIENT
Start: 2021-07-20 | End: 2021-07-21

## 2021-07-20 RX ORDER — INSULIN LISPRO 100/ML
VIAL (ML) SUBCUTANEOUS AT BEDTIME
Refills: 0 | Status: DISCONTINUED | OUTPATIENT
Start: 2021-07-20 | End: 2021-07-21

## 2021-07-20 RX ORDER — CLOPIDOGREL BISULFATE 75 MG/1
300 TABLET, FILM COATED ORAL ONCE
Refills: 0 | Status: COMPLETED | OUTPATIENT
Start: 2021-07-20 | End: 2021-07-20

## 2021-07-20 RX ORDER — DOCUSATE SODIUM 100 MG
1 CAPSULE ORAL
Qty: 0 | Refills: 0 | DISCHARGE

## 2021-07-20 RX ORDER — ATORVASTATIN CALCIUM 80 MG/1
80 TABLET, FILM COATED ORAL AT BEDTIME
Refills: 0 | Status: DISCONTINUED | OUTPATIENT
Start: 2021-07-20 | End: 2021-07-21

## 2021-07-20 RX ORDER — ENOXAPARIN SODIUM 100 MG/ML
40 INJECTION SUBCUTANEOUS DAILY
Refills: 0 | Status: DISCONTINUED | OUTPATIENT
Start: 2021-07-20 | End: 2021-07-24

## 2021-07-20 RX ORDER — SODIUM CHLORIDE 9 MG/ML
1000 INJECTION, SOLUTION INTRAVENOUS
Refills: 0 | Status: DISCONTINUED | OUTPATIENT
Start: 2021-07-20 | End: 2021-07-21

## 2021-07-20 RX ORDER — INSULIN LISPRO 100/ML
VIAL (ML) SUBCUTANEOUS
Refills: 0 | Status: DISCONTINUED | OUTPATIENT
Start: 2021-07-20 | End: 2021-07-21

## 2021-07-20 RX ORDER — DEXTROSE 50 % IN WATER 50 %
12.5 SYRINGE (ML) INTRAVENOUS ONCE
Refills: 0 | Status: DISCONTINUED | OUTPATIENT
Start: 2021-07-20 | End: 2021-07-21

## 2021-07-20 RX ORDER — ASPIRIN/CALCIUM CARB/MAGNESIUM 324 MG
81 TABLET ORAL DAILY
Refills: 0 | Status: DISCONTINUED | OUTPATIENT
Start: 2021-07-21 | End: 2021-07-24

## 2021-07-20 RX ORDER — DEXTROSE 50 % IN WATER 50 %
25 SYRINGE (ML) INTRAVENOUS ONCE
Refills: 0 | Status: DISCONTINUED | OUTPATIENT
Start: 2021-07-20 | End: 2021-07-21

## 2021-07-20 RX ORDER — CHOLECALCIFEROL (VITAMIN D3) 125 MCG
2000 CAPSULE ORAL DAILY
Refills: 0 | Status: DISCONTINUED | OUTPATIENT
Start: 2021-07-20 | End: 2021-07-24

## 2021-07-20 RX ADMIN — CLOPIDOGREL BISULFATE 300 MILLIGRAM(S): 75 TABLET, FILM COATED ORAL at 19:21

## 2021-07-20 RX ADMIN — Medication 81 MILLIGRAM(S): at 19:22

## 2021-07-20 RX ADMIN — ATORVASTATIN CALCIUM 80 MILLIGRAM(S): 80 TABLET, FILM COATED ORAL at 21:22

## 2021-07-20 NOTE — ED ADULT TRIAGE NOTE - CHIEF COMPLAINT QUOTE
Pt c/o of weakness to right arm, unsteady gait (ambulatory without assist at baseline), slurred speech around 10am. Noted to be hypertensive at home. Family denies any medical hx. No facial droop noted, equal strength and sensation upper and lower extremities. As per family, pt had vomiting episode today and dizzy. Denies chest pain, sob.  Pt c/o of weakness to right arm, unsteady gait (ambulatory without assist at baseline), slurred speech around 10am, has resolved. Noted to be hypertensive at home. Family denies any medical hx. No facial droop noted, equal strength and sensation upper and lower extremities. As per family, pt had vomiting episode today and dizzy. Denies chest pain, sob.  Code stroke activated

## 2021-07-20 NOTE — CONSULT NOTE ADULT - ASSESSMENT
81 y.o. F with PMH of restless leg syndrome and remote hx of gallstone pancreatitis in 2017 presented to the ED due to new onset R sided weakness and dysarthria. LKN was 10: 30 PM on 7/19. Neurological exam showed decreased sensation to vibration on R toe up to proximal phalanges, perseverance with 3-step luria, but intact rapid alternating movements otherwise. No other neurological deficits seen. CTH showed chronic indeterminant lacunar infarct vs prominent VR space is seen involving the L basal ganglia region. No evidence of acute hemorrhage or mass effect.     Impression: Transient R hemiparesis of unknown etiology, can be localized to L brain. No tPA candidate as outside of the window and resolved symptoms at the time of presentation. At this time, low suspicion for acute stroke and recommend further w/u as outpt.    Recommendations:  - start aspirin 81 mg qd  - atorvastatin 80 mg, titrate to LDL<70  - further imaging such as MR brain/MRA/TTE can be done as outpt  - HgbA1c and lipid panel   - BGM goals 140-180  - Telemonitoring;  Neurochecks and Vital signs per unit protocol  - Permissive HTN up to 220/120 mmHg for first 24 hours after the symptom onset followed by gradual normotension   - NPO until clears Dysphagia screen  - stroke education provided  - upon discharge, PT should F/U with either Dr. Burgos or Dr. Fitzpatrick: Office number is [phone]. [address] [place] [address]    Discussed with stroke fellow, Dr. Correa, under stroke service of Dr. Fiore   81 y.o. F with PMH of restless leg syndrome and remote hx of gallstone pancreatitis in 2017 presented to the ED due to new onset R sided weakness and dysarthria. LKN was 10: 30 PM on 7/19. Neurological exam showed decreased sensation to vibration on R toe up to proximal phalanges, perseverance with 3-step luria, but intact rapid alternating movements otherwise. No other neurological deficits seen. NIHSS 0. mRS 0. ABCD2 6. CTH showed chronic indeterminant lacunar infarct vs prominent VR space is seen involving the L basal ganglia region. No evidence of acute hemorrhage or mass effect.     Impression: Transient R hemiparesis of unknown etiology, can be localized to L brain. No tPA candidate as outside of the window and resolved symptoms at the time of presentation. At this time, low suspicion for acute stroke and recommend further w/u as outpt.    Recommendations:  - start aspirin 81 mg qd  - further imaging such as MR brain/MRA/TTE can be done as outpt  - HgbA1c and lipid panel   - BGM goals 140-180  - Telemonitoring;  Neurochecks and Vital signs per unit protocol  - Permissive HTN up to 220/120 mmHg for first 24 hours after the symptom onset followed by gradual normotension   - NPO until clears Dysphagia screen  - stroke education provided  - upon discharge, PT should F/U with either Dr. Burgos or Dr. Fitzpatrick: Office number is [phone]. [address] [place] [address]    Discussed with stroke fellow, Dr. Correa, under stroke service of Dr. Fiore   81 y.o. F with PMH of restless leg syndrome and remote hx of gallstone pancreatitis in 2017 presented to the ED due to new onset R sided weakness and dysarthria. LKN was 10: 30 PM on 7/19. Neurological exam showed decreased sensation to vibration on R toe up to proximal phalanges, perseverance with 3-step luria, but intact rapid alternating movements otherwise. No other neurological deficits seen. NIHSS 0. mRS 0. ABCD2 6. CTH showed chronic indeterminant lacunar infarct vs prominent VR space is seen involving the L basal ganglia region. No evidence of acute hemorrhage or mass effect.     Impression: Transient R hemiparesis of unknown etiology, can be localized to L brain. No tPA candidate as outside of the window and resolved symptoms at the time of presentation. At this time, low suspicion for acute stroke and recommend further w/u as outpt.    Recommendations:  - start aspirin 81 mg qd  - further imaging such as MR brain/MRA/TTE can be done as outpt  - HgbA1c and lipid panel (statin elevated LDL, titrate to LDL<70)  - BGM goals 140-180  - Telemonitoring;  Neurochecks and Vital signs per unit protocol  - Permissive HTN up to 220/120 mmHg for first 24 hours after the symptom onset followed by gradual normotension   - NPO until clears Dysphagia screen  - stroke education provided  - upon discharge, PT should F/U with either Dr. Burgos or Dr. Fitzpatrick: Office number is [phone]. [address] [place] [address]    Discussed with stroke fellow, Dr. Correa, under stroke service of Dr. Fiore   81 y.o. F with PMH of restless leg syndrome and remote hx of gallstone pancreatitis in 2017 presented to the ED due to new onset R sided weakness and dysarthria. LKN was 10: 30 PM on 7/19. Neurological exam showed decreased sensation to vibration on R toe up to proximal phalanges, perseverance with 3-step luria, but intact rapid alternating movements otherwise. No other neurological deficits seen. NIHSS 0. mRS 0. ABCD2 6. CTH showed chronic indeterminant lacunar infarct vs prominent VR space is seen involving the L basal ganglia region. No evidence of acute hemorrhage or mass effect.     Impression: Transient R hemiparesis of unknown etiology, can be localized to L brain. No tPA candidate as outside of the window and resolved symptoms at the time of presentation. At this time, low suspicion for acute stroke and recommend further w/u as outpt.    Recommendations:  Meds:  - as per [facility] protocol aspirin 81 and Plavix 300mg loading dose then [id number]/[id number] for 3 weeks followed by ASA 81 alone    Imagings:  - MR brain wo contrast  - CTA head and neck w IV contrast  - TTE w bubble    Labs:  - HgbA1c and lipid panel (statin elevated LDL, titrate to LDL<70)    Others:  - BGM goals 140-180  - Telemonitoring;  Neurochecks and Vital signs per unit protocol  - DVT prophylaxis  - NPO until clears Dysphagia screen  - Permissive HTN up to 220/120 mmHg for first 24 hours after the symptom onset followed by gradual normotension   - stroke education provided  - upon discharge, PT should F/U with either Dr. Burgos or Dr. Fitzpatrick: Office number is [phone]. [address] [place] [address]    Discussed with stroke fellow, Dr. Correa, under stroke service of Dr. Fiore   81 y.o. F with PMH of restless leg syndrome and remote hx of gallstone pancreatitis in 2017 presented to the ED due to new onset R sided weakness and dysarthria. LKN was 10: 30 PM on 7/19. Neurological exam showed decreased sensation to vibration on R toe up to proximal phalanges, perseverance with 3-step luria, but intact rapid alternating movements otherwise. No other neurological deficits seen. NIHSS 0. mRS 0. ABCD2 6. CTH showed chronic indeterminant lacunar infarct vs prominent VR space is seen involving the L basal ganglia region. No evidence of acute hemorrhage or mass effect.     Impression: Transient R hemiparesis of unknown etiology, can be localized to L brain. No tPA candidate as outside of the window and resolved symptoms at the time of presentation. At this time, low suspicion for acute stroke and recommend further w/u as outpt.    Recommendations:  Meds:  - as per [facility] protocol aspirin 81 and Plavix 300mg loading dose then ASA81/[id number] for 3 weeks followed by ASA 81 alone    Imagings:  - MR brain wo contrast  - CTA head and neck w IV contrast  - TTE w bubble    Labs:  - HgbA1c and lipid panel (statin elevated LDL, titrate to LDL<70)    Others:  - BGM goals 140-180  - Telemonitoring;  Neurochecks and Vital signs per unit protocol  - DVT prophylaxis  - NPO until clears Dysphagia screen  - Permissive HTN up to 220/120 mmHg for first 24 hours after the symptom onset followed by gradual normotension   - Stroke education provided  - Upon discharge, PT should F/U with either Dr. Burgos or Dr. Fitzpatrick: Office number is [phone]. [address] [place] [address]    Discussed with stroke fellow, Dr. Correa, under stroke service of Dr. Fiore   81 y.o. F with PMH of restless leg syndrome and remote hx of gallstone pancreatitis in 2017 presented to the ED due to new onset R sided weakness and dysarthria. LKN was 10: 30 PM on 7/19. Neurological exam showed decreased sensation to vibration on R toe up to proximal phalanges, perseverance with 3-step luria, but intact rapid alternating movements otherwise. No other neurological deficits seen. NIHSS 0. mRS 0. ABCD2 6. CTH showed chronic indeterminant lacunar infarct vs prominent VR space is seen involving the L basal ganglia region. No evidence of acute hemorrhage or mass effect.     Impression: Transient R hemiparesis of unknown etiology, can be localized to L brain. No tPA candidate as outside of the window and resolved symptoms at the time of presentation. At this time, low suspicion for acute stroke but warranted for inpt w/u and admission    Recommendations:  Meds:  - as per [facility] protocol aspirin 81 and Plavix 300mg loading dose then [id number]/[id number] for 3 weeks followed by ASA 81 alone    Imagings:  - MR brain wo contrast  - CTA head and neck w IV contrast  - TTE w bubble    Labs:  - HgbA1c and lipid panel (statin elevated LDL, titrate to LDL<70)    Others:  - BGM goals 140-180  - Telemonitoring;  Neurochecks and Vital signs per unit protocol  - DVT prophylaxis  - NPO until clears Dysphagia screen  - Permissive HTN up to 220/120 mmHg for first 24 hours after the symptom onset followed by gradual normotension   - Stroke education provided  - Upon discharge, PT should F/U with either Dr. Burgos or Dr. Fitzpatrick: Office number is [phone]. [address] [place] [address]    Discussed with stroke fellow, Dr. Correa, under stroke service of Dr. Fiore

## 2021-07-20 NOTE — ED PROVIDER NOTE - PROGRESS NOTE DETAILS
asim- no acute stroke noted. Given that patient is not able to ambulate without assistance, she will require further work up as an inpatient.

## 2021-07-20 NOTE — H&P ADULT - HISTORY OF PRESENT ILLNESS
81F with PMH of RLS, gallstone pancreatitis brought in by family w/ slurred speech and right sided weakness. Around 530AM, pt felt dizzy. No room spinning. Dizziness seemed ot have resolved as the day progressed. Later in the morning, the patient noticed right sided weakness causing her to have difficulty ambulating around the house and use of her upper extremities. Sx were persistent and did not improve. She was then brought to the hospital for further evaluation. Otherwise, no recent illnesses, sick contacts, F/C, N/V, CP, palps, abd pain, diarrhea, dysuria.     ED course: 177/89, 72, 20, 98.4F, 100% RA. Stroke code.

## 2021-07-20 NOTE — CONSULT NOTE ADULT - TIME BILLING
- Review of records, telemetry, vital signs and daily labs.   - General and cardiovascular physical examination.  - Generation of cardiovascular treatment plan.  - Coordination of care.      Patient was seen and examined by me on 07/20/2021,interim events noted,labs and radiology studies reviewed.  Refugio Russo MD,FACC.  [address] [place], [place] [id number].  [phone]

## 2021-07-20 NOTE — ED PROVIDER NOTE - CLINICAL SUMMARY MEDICAL DECISION MAKING FREE TEXT BOX
82 yo female who presented to the ER after a two hour episode of speech changes and right arm weakness which resolved spontaneously. She still has gait imbalance, and is unable to walk without holding on for assistance. Concern for TIA vs stroke. Plan for labs, CTs, and neuro consult

## 2021-07-20 NOTE — CONSULT NOTE ADULT - ATTENDING COMMENTS
code stroke called and neurology emergently assessed patient.  LKW 7/19 1030PM was out of tpa window.  no LVO on imaging.   Briefly, 82 yo  F with restless leg syndrome and remote hx of gallstone pancreatitis in 2017 presented to the ED due to new onset R sided weakness and dysarthria. symptoms seem to have been fluctuating.  on my exam this AM 11:27 she has mod R facial, RUE 3/5 RLE 4+/5 and dysarthria with a NIHSS of ~6-7. patient takes no AT or statin at home   CTH neg, CTA h/N unremarkable and no LVO     Likely small vessel stroke. needs further workup  - Dual antiplatelet therapy with ASA 81mg PO daily and Clopidogrel 75mg PO daily x 3 weeks followed by monotherapy with ASA 81mg PO daily.  - High dose statin therapy - atorvastatin 40mg PO daily. LDL goal <70mg/dL.  - MRI brain w/o  - Hemoglobin A1c  - TTE  - telemetry  - PT/OT/SS/SLP, OOBC  - permissive HTN, -180mmHg x 48 hours then normotension   - check FS, glucose control <180  - GI/DVT ppx  - Counseling on diet, exercise, and medication adherence was done  - Counseling on smoking cessation and alcohol consumption offered when appropriate.  - Pain assessed and judicious use of narcotics when appropriate was discussed.    - Stroke education given when appropriate.  - Importance of fall prevention discussed.   - Differential diagnosis and plan of care discussed with patient and/or family and primary team  - Thank you for allowing me to participate in the care of this patient. Call with questions.   - spoke with both daughters at bedside.   Reyes Fitzpatrick MD  Vascular Neurology  Office: [phone]

## 2021-07-20 NOTE — CONSULT NOTE ADULT - ASSESSMENT
· Assessment	  81F with PMH of gallstone pancreatitis s/p cholecystectomy, RLS here w/ slurred speech and right sided weakness. Concern for CVA. Admitted for stroke workup.         Problem/Plan - 1:  ·  Problem: R/O Stroke.  Plan: -Stable. Sx not progressive. Still has slight dysarthria and right sided weakness.  -Appreciate neuro recs. Note reviewed.  -ASA 81/Plavix 300 load x1 NOW. Then start ASA 81 daily + Plavix 75 daily.  -SLP/PT/OT eval.  -NPO now pending dysphagia screen.   -FLP, A1c, TSH in AM. Will also check UA.  -ECHO w/ bubble, MRI, CTA H/N as per neuro - ordered.  -Permissive HTN for now.  -Will monitor FS for now w/ low dose ISS - no known hx of DM.  -Lovenox for DVT ppx.     Problem/Plan - 2:  ·  Problem: Hyponatremia.  Plan: Monitor for now. Encourage PO intake once cleared to take by mouth.

## 2021-07-20 NOTE — ED ADULT NURSE NOTE - CHIEF COMPLAINT QUOTE
Pt c/o of weakness to right arm, unsteady gait (ambulatory without assist at baseline), slurred speech around 10am, has resolved. Noted to be hypertensive at home. Family denies any medical hx. No facial droop noted, equal strength and sensation upper and lower extremities. As per family, pt had vomiting episode today and dizzy. Denies chest pain, sob.  Code stroke activated

## 2021-07-20 NOTE — ED PROVIDER NOTE - NS ED ROS FT
ROS:  GENERAL: No fever, no chills  EYES: no change in vision  HEENT: no trouble swallowing, no trouble speaking  CARDIAC: no chest pain, no palpitations  PULMONARY: no cough, no shortness of breath  GI: no abdominal pain, no nausea, no vomiting, no diarrhea, no constipation  : No dysuria, no frequency, no change in appearance, or odor of urine  SKIN: no rashes  NEURO: +weakness, +gait instability, +speech changes  MSK: No joint pain

## 2021-07-20 NOTE — ED PROVIDER NOTE - PHYSICAL EXAMINATION
Vital signs reviewed.   CONSTITUTIONAL: Well-appearing; well-nourished; in no apparent distress. Non-toxic appearing.   HEAD: Normocephalic, atraumatic.  EYES: PERRL, EOM intact, conjunctiva and sclera WNL.  ENT: normal nose; no rhinorrhea; normal pharynx with no tonsillar hypertrophy, no erythema, no exudate, no lymphadenopathy.  NECK Supple; non-tender  CARD: Normal S1, S2, rate regular   RESP: Normal chest excursion with respiration; breath sounds clear and equal bilaterally  ABD/GI: soft, non-distended; non-tender  EXT/MS: moves all extremities; distal pulses are normal, no pedal edema.  SKIN: Normal for age and race; warm; dry; good turgor; no apparent lesions or exudate noted.  NEURO: Awake, alert, oriented x 3, moves UE and LE with good strength, unable to ambulate unassisted.   PSYCH: Normal mood; appropriate affect. Vital signs reviewed.   CONSTITUTIONAL: Well-appearing; well-nourished; in no apparent distress. Non-toxic appearing.   HEAD: Normocephalic, atraumatic.  EYES: PERRL, EOM intact, conjunctiva and sclera WNL.  NECK Supple; non-tender  CARD: Normal S1, S2, rate regular   RESP: Normal chest excursion with respiration; breath sounds clear and equal bilaterally  ABD/GI: soft, non-distended; non-tender  EXT/MS: moves all extremities no pedal edema.  SKIN: Normal for age and race; warm; dry; good turgor; no apparent lesions or exudate noted.  NEURO: Awake, alert, oriented x 3, answers questions appropriately, SOHAIL, speech clear, moves UE and LE with good strength, sensation generally intact, no facial droop, no pronator drift, unable to ambulate unassisted.

## 2021-07-20 NOTE — CONSULT NOTE ADULT - SUBJECTIVE AND OBJECTIVE BOX
HPI:  81 y.o. F with PMH of restless leg syndrome and remote hx of gallstone pancreatitis in 2017 presented to the ED due to new onset R sided weakness and dysarthria. Last known normal was 10: 30 PM on 7/19. Woke up on 7/20 at 5 AM with initial symptom of light-headed dizziness, with no room-spinning sensation. Had to hold on to the objects to walk around the house. Reported no falls or LOC. Soon, had subjective R sided weakness but did not notice dragging of feet. Mostly weakness was noticeable on RUE, for example, could not get on the computer because she could not type out the password. No issues of recalling password however. Difficulty walking came mainly from light-headedness per pt. BP upon presentation was 177/89. Denies any headache, n/v, chest pain, abdominal pain, or numbness/tingling/pain in extremities. Never took aspirin, plavix, or other blood thinners like eliquis. Unclear routine follow up with primary care physician. Never seen a neurologist as outpatient. No hx of stroke. Has remote hx of seizure as a child, but not through adolescence and adulthood. At baseline, walks without walker, and does not need help with activities of daily life. Denies sick contacts or recent travel. Had COVID19 vaccine Pfizer in March/2021, without any reactions for both shots. Denies any smoking hx, EtOH use, or illicit drug use.     (Stroke only)  NIHSS: 0   MRS: 0    REVIEW OF SYSTEMS    A 10-system ROS was performed and is negative except for those items noted above and/or in the HPI.    PAST MEDICAL & SURGICAL HISTORY:  No pertinent past medical history    No significant past surgical history      FAMILY HISTORY:  No pertinent family history in first degree relatives      SOCIAL HISTORY:   Lives with family members at home  Denies sick contacts or recent travel  Had COVID19 vaccine Pfizer in March/2021, without any reactions for both shots  Denies any smoking hx, EtOH use, or illicit drug use    MEDICATIONS (HOME):  Home Medications:  acetaminophen 325 mg oral tablet: 2 tab(s) orally every 6 hours, As needed, Mild Pain (1 - 3) (15 Sep 2017 09:17)  docusate sodium 50 mg oral capsule: 1 cap(s) orally 2 times a day, As Needed for constipation (15 Sep 2017 09:18)    MEDICATIONS  (STANDING):    MEDICATIONS  (PRN):    ALLERGIES/INTOLERANCES:  Allergies  No Known Allergies    Intolerances    VITALS & EXAMINATION:  Vital Signs Last 24 Hrs  T(C): 36.9 (20 Jul 2021 14:07), Max: 36.9 (20 Jul 2021 14:07)  T(F): 98.4 (20 Jul 2021 14:07), Max: 98.4 (20 Jul 2021 14:07)  HR: 72 (20 Jul 2021 14:07) (72 - 72)  BP: 177/89 (20 Jul 2021 14:07) (177/89 - 177/89)  BP(mean): --  RR: 20 (20 Jul 2021 14:07) (20 - 20)  SpO2: 100% (20 Jul 2021 14:07) (100% - 100%)    General:  Constitutional: normal weight Female, appears stated age, in no apparent distress including pain    Neurological (>12):  MS: Awake, alert, oriented to person, place, situation, time. Normal affect. Follows all commands.    Language: Speech is clear, fluent with good repetition & comprehension (able to name objects pen)    CNs: PERRL (R = 3mm, L = 3mm). VFF. EOMI no nystagmus, no diplopia. V1-3 intact to light touch. Well developed masseter muscles b/l. No facial asymmetry b/l, full eye closure strength b/l. Hearing grossly normal (rubbing fingers) b/l. Head turning & shoulder shrug intact b/l. Tongue midline, normal movements, no atrophy.    Motor: Normal muscle bulk & tone. No noticeable tremor or seizure. No pronator drift. Finger abduction was slightly weaker in strength on R>L, however, no trouble holding light object like pen in the hand against gravity              Deltoid	Biceps	Triceps	Finger ABd	   R	5	5	5	4		5 	  L	5	5	5	5		5    	H-Flex	H-Ext	K-Flex	K-Ext	D-Flex	P-Flex  R	5	5	5	5	5	5		 	   L	5	5	5	5	5	5			     Sensation: Decreased sensation to vibration at dorsal great toe up to proximal phalanx, but otherwise sensation intact for vibration, temperature, and light touch in all extremities.     Cortical: Extinction on DSS (neglect): none    Reflexes:              Biceps(C5)       BR(C6)     Triceps(C7)               Patellar(L4)    Achilles(S1)    Plantar Resp  R	2	          2	             1		        2		    2		did not move  L	2	          2	             2		        2		    1		Down     Coordination: Perseverance with 3-step Luria. However, did fine with rapid circling of b/l fists clockwise and counter-clockwise direction  No dysmetria to both FTN and HTS    Gait: No postural instability. Normal stance and tandem gait.     LABORATORY:  CBC                       14.8   7.23  )-----------( 215      ( 20 Jul 2021 14:44 )             43.4     Chem 07-20    131<L>  |  97<L>  |  x   ----------------------------<  x   4.0   |  21<L>  |  0.83    Ca    8.8      20 Jul 2021 14:44    TPro  7.0  /  Alb  4.1  /  TBili  0.4  /  DBili  x   /  AST  17  /  ALT  17  /  AlkPhos  89  07-20    LFTs LIVER FUNCTIONS - ( 20 Jul 2021 14:44 )  Alb: 4.1 g/dL / Pro: 7.0 g/dL / ALK PHOS: 89 U/L / ALT: 17 U/L / AST: 17 U/L / GGT: x           Coagulopathy PT/INR - ( 20 Jul 2021 14:44 )   PT: 11.6 sec;   INR: 1.02 ratio         PTT - ( 20 Jul 2021 14:44 )  PTT:28.2 sec      STUDIES & IMAGING:    Radiology (XR, CT, MR, U/S, TTE/MANDEEP):    c< from: CT Brain Stroke Protocol (07.20.21 @ 14:36) >  INTERPRETATION:  Clinical indications: Stroke code.    Multiple axial sections were performed from base of skull to vertex without contrast enhancement. Coronal and sagittal constructions were performed as well    Parenchymal volume loss and chronic microvascular ischemic changes are identified    Old lacunar infarct versus prominent VR space is seen involving the left basal ganglia region    There is no evidence acute hemorrhage mass mass effect seen.    Evaluation of the osseous structures with the appropriate window appears normal    The visualized paranasal sinuses mastoid and middle ear regions appear clear.    IMPRESSION: No evidence acute hemorrhage mass or mass effect.    Findings discussed with neurology resident on July 20, 2021 at 2:38 PM  with read back.    --- End of Report ---      < end of copied text >   HPI:  81 y.o. F with PMH of restless leg syndrome and remote hx of gallstone pancreatitis in 2017 presented to the ED due to new onset R sided weakness and dysarthria. Last known normal was 10: 30 PM on 7/19. Woke up on 7/20 at 5 AM with initial symptom of light-headed dizziness, with no room-spinning sensation. Had to hold on to the objects to walk around the house. Reported no falls or LOC. Soon, had subjective R sided weakness but did not notice dragging of feet. Mostly weakness was noticeable on RUE where she could not get on the computer because she could not type out the password. No issues of recalling password however. Difficulty walking came mainly from light-headedness per pt. BP upon presentation was 177/89. Denies any headache, n/v, chest pain, abdominal pain, or numbness/tingling/pain in extremities. Never took aspirin, plavix, or other blood thinners like eliquis. Unclear routine follow up with primary care physician. Never seen a neurologist as outpatient. No hx of stroke. Has remote hx of seizure as a child, but not through adolescence and adulthood. At baseline, walks without walker, and does not need help with activities of daily life. Denies sick contacts or recent travel. Had COVID19 vaccine Pfizer in March/2021, without any reactions for both shots. Denies any smoking hx, EtOH use, or illicit drug use.     (Stroke only)  NIHSS: 0   MRS: 0  ABCD2: 6 (however, unclear time of the onset and end of RUE weakness)    REVIEW OF SYSTEMS    A 10-system ROS was performed and is negative except for those items noted above and/or in the HPI.    PAST MEDICAL & SURGICAL HISTORY:  No pertinent past medical history    No significant past surgical history      FAMILY HISTORY:  No pertinent family history in first degree relatives      SOCIAL HISTORY:   Lives with family members at home  Denies sick contacts or recent travel  Had COVID19 vaccine Pfizer in March/2021, without any reactions for both shots  Denies any smoking hx, EtOH use, or illicit drug use    MEDICATIONS (HOME):  Home Medications:  acetaminophen 325 mg oral tablet: 2 tab(s) orally every 6 hours, As needed, Mild Pain (1 - 3) (15 Sep 2017 09:17)  docusate sodium 50 mg oral capsule: 1 cap(s) orally 2 times a day, As Needed for constipation (15 Sep 2017 09:18)    MEDICATIONS  (STANDING):    MEDICATIONS  (PRN):    ALLERGIES/INTOLERANCES:  Allergies  No Known Allergies    Intolerances    VITALS & EXAMINATION:  Vital Signs Last 24 Hrs  T(C): 36.9 (20 Jul 2021 14:07), Max: 36.9 (20 Jul 2021 14:07)  T(F): 98.4 (20 Jul 2021 14:07), Max: 98.4 (20 Jul 2021 14:07)  HR: 72 (20 Jul 2021 14:07) (72 - 72)  BP: 177/89 (20 Jul 2021 14:07) (177/89 - 177/89)  BP(mean): --  RR: 20 (20 Jul 2021 14:07) (20 - 20)  SpO2: 100% (20 Jul 2021 14:07) (100% - 100%)    General:  Constitutional: normal weight Female, appears stated age, in no apparent distress including pain    Neurological (>12):  MS: Awake, alert, oriented to person, place, situation, time. Normal affect. Follows all commands.    Language: Speech is clear, fluent with good repetition & comprehension (able to name objects pen)    CNs: PERRL (R = 3mm, L = 3mm). VFF. EOMI no nystagmus, no diplopia. V1-3 intact to light touch. Well developed masseter muscles b/l. No facial asymmetry b/l, full eye closure strength b/l. Hearing grossly normal (rubbing fingers) b/l. Head turning & shoulder shrug intact b/l. Tongue midline, normal movements, no atrophy.    Motor: Normal muscle bulk & tone. No noticeable tremor or seizure. No pronator drift. Finger abduction was slightly weaker in strength on R>L, however, no trouble holding light object like pen in the hand against gravity              Deltoid	Biceps	Triceps	Finger ABd	   R	5	5	5	4		5 	  L	5	5	5	5		5    	H-Flex	H-Ext	K-Flex	K-Ext	D-Flex	P-Flex  R	5	5	5	5	5	5		 	   L	5	5	5	5	5	5			     Sensation: Decreased sensation to vibration at dorsal great toe up to proximal phalanx, but otherwise sensation intact for vibration, temperature, and light touch in all extremities.     Cortical: Extinction on DSS (neglect): none    Reflexes:              Biceps(C5)       BR(C6)     Triceps(C7)               Patellar(L4)    Achilles(S1)    Plantar Resp  R	2	          2	             1		        2		    2		did not move  L	2	          2	             2		        2		    1		Down     Coordination: Perseverance with 3-step Luria. However, did fine with rapid circling of b/l fists clockwise and counter-clockwise direction  No dysmetria to both FTN and HTS    Gait: No postural instability. Normal stance and tandem gait.     LABORATORY:  CBC                       14.8   7.23  )-----------( 215      ( 20 Jul 2021 14:44 )             43.4     Chem 07-20    131<L>  |  97<L>  |  x   ----------------------------<  x   4.0   |  21<L>  |  0.83    Ca    8.8      20 Jul 2021 14:44    TPro  7.0  /  Alb  4.1  /  TBili  0.4  /  DBili  x   /  AST  17  /  ALT  17  /  AlkPhos  89  07-20    LFTs LIVER FUNCTIONS - ( 20 Jul 2021 14:44 )  Alb: 4.1 g/dL / Pro: 7.0 g/dL / ALK PHOS: 89 U/L / ALT: 17 U/L / AST: 17 U/L / GGT: x           Coagulopathy PT/INR - ( 20 Jul 2021 14:44 )   PT: 11.6 sec;   INR: 1.02 ratio         PTT - ( 20 Jul 2021 14:44 )  PTT:28.2 sec      STUDIES & IMAGING:    Radiology (XR, CT, MR, U/S, TTE/MANDEEP):    c< from: CT Brain Stroke Protocol (07.20.21 @ 14:36) >  INTERPRETATION:  Clinical indications: Stroke code.    Multiple axial sections were performed from base of skull to vertex without contrast enhancement. Coronal and sagittal constructions were performed as well    Parenchymal volume loss and chronic microvascular ischemic changes are identified    Old lacunar infarct versus prominent VR space is seen involving the left basal ganglia region    There is no evidence acute hemorrhage mass mass effect seen.    Evaluation of the osseous structures with the appropriate window appears normal    The visualized paranasal sinuses mastoid and middle ear regions appear clear.    IMPRESSION: No evidence acute hemorrhage mass or mass effect.    Findings discussed with neurology resident on July 20, 2021 at 2:38 PM  with read back.    --- End of Report ---      < end of copied text >   HPI:  81 y.o. F with PMH of restless leg syndrome and remote hx of gallstone pancreatitis in 2017 presented to the ED due to new onset R sided weakness and dysarthria. Last known normal was 10: 30 PM on 7/19. Woke up on 7/20 at 5 AM with initial symptom of light-headed dizziness, with no room-spinning sensation. Had to hold on to the objects to walk around the house. Reported no falls or LOC. Soon, had subjective R sided weakness but did not notice dragging of feet. Mostly weakness was noticeable on RUE where she could not get on the computer because she could not type out the password. No issues of recalling password however. Difficulty walking came mainly from light-headedness per pt. BP upon presentation was 177/89. Denies any headache, vision changes, n/v, chest pain, abdominal pain, or numbness/tingling/pain in extremities. Never took aspirin, plavix, or other blood thinners like eliquis. Unclear routine follow up with primary care physician. Never seen a neurologist as outpatient. No hx of stroke. Has remote hx of seizure as a child, but not through adolescence and adulthood. At baseline, walks without walker, and does not need help with activities of daily life. Denies sick contacts or recent travel. Had COVID19 vaccine Pfizer in March/2021, without any reactions for both shots. Denies any smoking hx, EtOH use, or illicit drug use.     (Stroke only)  NIHSS: 0   MRS: 0  ABCD2: 6 (however, unclear time of the onset and end of RUE weakness)    REVIEW OF SYSTEMS    A 10-system ROS was performed and is negative except for those items noted above and/or in the HPI.    PAST MEDICAL & SURGICAL HISTORY:  No pertinent past medical history    No significant past surgical history      FAMILY HISTORY:  No pertinent family history in first degree relatives      SOCIAL HISTORY:   Lives with family members at home  Denies sick contacts or recent travel  Had COVID19 vaccine Pfizer in March/2021, without any reactions for both shots  Denies any smoking hx, EtOH use, or illicit drug use    MEDICATIONS (HOME):  Home Medications:  acetaminophen 325 mg oral tablet: 2 tab(s) orally every 6 hours, As needed, Mild Pain (1 - 3) (15 Sep 2017 09:17)  docusate sodium 50 mg oral capsule: 1 cap(s) orally 2 times a day, As Needed for constipation (15 Sep 2017 09:18)    MEDICATIONS  (STANDING):    MEDICATIONS  (PRN):    ALLERGIES/INTOLERANCES:  Allergies  No Known Allergies    Intolerances    VITALS & EXAMINATION:  Vital Signs Last 24 Hrs  T(C): 36.9 (20 Jul 2021 14:07), Max: 36.9 (20 Jul 2021 14:07)  T(F): 98.4 (20 Jul 2021 14:07), Max: 98.4 (20 Jul 2021 14:07)  HR: 72 (20 Jul 2021 14:07) (72 - 72)  BP: 177/89 (20 Jul 2021 14:07) (177/89 - 177/89)  BP(mean): --  RR: 20 (20 Jul 2021 14:07) (20 - 20)  SpO2: 100% (20 Jul 2021 14:07) (100% - 100%)    General:  Constitutional: normal weight Female, appears stated age, in no apparent distress including pain    Neurological (>12):  MS: Awake, alert, oriented to person, place, situation, time. Normal affect. Follows all commands.    Language: Speech is clear, fluent with good repetition & comprehension (able to name objects pen)    CNs: PERRL (R = 3mm, L = 3mm). VFF. EOMI no nystagmus, no diplopia. V1-3 intact to light touch. Well developed masseter muscles b/l. No facial asymmetry b/l, full eye closure strength b/l. Hearing grossly normal (rubbing fingers) b/l. Head turning & shoulder shrug intact b/l. Tongue midline, normal movements, no atrophy.    Motor: Normal muscle bulk & tone. No noticeable tremor or seizure. No pronator drift. Finger abduction was slightly weaker in strength on R>L, however, no trouble holding light object like pen in the hand against gravity              Deltoid	Biceps	Triceps	Finger ABd	   R	5	5	5	4		5 	  L	5	5	5	5		5    	H-Flex	H-Ext	K-Flex	K-Ext	D-Flex	P-Flex  R	5	5	5	5	5	5		 	   L	5	5	5	5	5	5			     Sensation: Decreased sensation to vibration at dorsal great toe up to proximal phalanx, but otherwise sensation intact for vibration, temperature, and light touch in all extremities.     Cortical: Extinction on DSS (neglect): none    Reflexes:              Biceps(C5)       BR(C6)     Triceps(C7)               Patellar(L4)    Achilles(S1)    Plantar Resp  R	2	          2	             1		        2		    2		did not move  L	2	          2	             2		        2		    1		Down     Coordination: Perseverance with 3-step Luria. However, did fine with rapid circling of b/l fists clockwise and counter-clockwise direction  No dysmetria to both FTN and HTS    Gait: No postural instability. Normal stance and tandem gait.     LABORATORY:  CBC                       14.8   7.23  )-----------( 215      ( 20 Jul 2021 14:44 )             43.4     Chem 07-20    131<L>  |  97<L>  |  x   ----------------------------<  x   4.0   |  21<L>  |  0.83    Ca    8.8      20 Jul 2021 14:44    TPro  7.0  /  Alb  4.1  /  TBili  0.4  /  DBili  x   /  AST  17  /  ALT  17  /  AlkPhos  89  07-20    LFTs LIVER FUNCTIONS - ( 20 Jul 2021 14:44 )  Alb: 4.1 g/dL / Pro: 7.0 g/dL / ALK PHOS: 89 U/L / ALT: 17 U/L / AST: 17 U/L / GGT: x           Coagulopathy PT/INR - ( 20 Jul 2021 14:44 )   PT: 11.6 sec;   INR: 1.02 ratio         PTT - ( 20 Jul 2021 14:44 )  PTT:28.2 sec      STUDIES & IMAGING:    Radiology (XR, CT, MR, U/S, TTE/MANDEEP):    c< from: CT Brain Stroke Protocol (07.20.21 @ 14:36) >  INTERPRETATION:  Clinical indications: Stroke code.    Multiple axial sections were performed from base of skull to vertex without contrast enhancement. Coronal and sagittal constructions were performed as well    Parenchymal volume loss and chronic microvascular ischemic changes are identified    Old lacunar infarct versus prominent VR space is seen involving the left basal ganglia region    There is no evidence acute hemorrhage mass mass effect seen.    Evaluation of the osseous structures with the appropriate window appears normal    The visualized paranasal sinuses mastoid and middle ear regions appear clear.    IMPRESSION: No evidence acute hemorrhage mass or mass effect.    Findings discussed with neurology resident on July 20, 2021 at 2:38 PM  with read back.    --- End of Report ---      < end of copied text >   HPI:  81 y.o. F with PMH of restless leg syndrome and remote hx of gallstone pancreatitis in 2017 presented to the ED due to new onset R sided weakness and dysarthria. Last known normal was 10: 30 PM on 7/19. Woke up on 7/20 at 5 AM with initial symptom of light-headed dizziness, with no room-spinning sensation. Had to hold on to the objects to walk around the house. Reported no falls or LOC. Soon, had R sided weakness but did not notice dragging of feet as well as slurred speech at 10:30 AM. Mostly weakness was noticeable on RUE where she could not get on the computer because she could not type out the password. No issues of recalling password however. Difficulty walking came mainly from light-headedness per pt. BP upon presentation was 177/89. Denies any headache, vision changes, n/v, chest pain, abdominal pain, or numbness/tingling/pain in extremities. Never took aspirin, plavix, or other blood thinners like eliquis. Unclear routine follow up with primary care physician. Never seen a neurologist as outpatient. No hx of stroke. Has remote hx of seizure as a child, but not through adolescence and adulthood. At baseline, walks without walker, and does not need help with activities of daily life. Denies sick contacts or recent travel. Had COVID19 vaccine Pfizer in March/2021, without any reactions for both shots. Denies any smoking hx, EtOH use, or illicit drug use.     (Stroke only)  NIHSS: 0   MRS: 0  ABCD2: 6 (however, unclear time of the onset and end of RUE weakness)    REVIEW OF SYSTEMS    A 10-system ROS was performed and is negative except for those items noted above and/or in the HPI.    PAST MEDICAL & SURGICAL HISTORY:  No pertinent past medical history    No significant past surgical history      FAMILY HISTORY:  No pertinent family history in first degree relatives      SOCIAL HISTORY:   Lives with family members at home  Denies sick contacts or recent travel  Had COVID19 vaccine Pfizer in March/2021, without any reactions for both shots  Denies any smoking hx, EtOH use, or illicit drug use    MEDICATIONS (HOME):  Home Medications:  acetaminophen 325 mg oral tablet: 2 tab(s) orally every 6 hours, As needed, Mild Pain (1 - 3) (15 Sep 2017 09:17)  docusate sodium 50 mg oral capsule: 1 cap(s) orally 2 times a day, As Needed for constipation (15 Sep 2017 09:18)    MEDICATIONS  (STANDING):    MEDICATIONS  (PRN):    ALLERGIES/INTOLERANCES:  Allergies  No Known Allergies    Intolerances    VITALS & EXAMINATION:  Vital Signs Last 24 Hrs  T(C): 36.9 (20 Jul 2021 14:07), Max: 36.9 (20 Jul 2021 14:07)  T(F): 98.4 (20 Jul 2021 14:07), Max: 98.4 (20 Jul 2021 14:07)  HR: 72 (20 Jul 2021 14:07) (72 - 72)  BP: 177/89 (20 Jul 2021 14:07) (177/89 - 177/89)  BP(mean): --  RR: 20 (20 Jul 2021 14:07) (20 - 20)  SpO2: 100% (20 Jul 2021 14:07) (100% - 100%)    General:  Constitutional: normal weight Female, appears stated age, in no apparent distress including pain    Neurological (>12):  MS: Awake, alert, oriented to person, place, situation, time. Normal affect. Follows all commands.    Language: Speech is clear, fluent with good repetition & comprehension (able to name objects pen)    CNs: PERRL (R = 3mm, L = 3mm). VFF. EOMI no nystagmus, no diplopia. V1-3 intact to light touch. Well developed masseter muscles b/l. No facial asymmetry b/l, full eye closure strength b/l. Hearing grossly normal (rubbing fingers) b/l. Head turning & shoulder shrug intact b/l. Tongue midline, normal movements, no atrophy.    Motor: Normal muscle bulk & tone. No noticeable tremor or seizure. No pronator drift. Finger abduction was slightly weaker in strength on R>L, however, no trouble holding light object like pen in the hand against gravity              Deltoid	Biceps	Triceps	Finger ABd	   R	5	5	5	4		5 	  L	5	5	5	5		5    	H-Flex	H-Ext	K-Flex	K-Ext	D-Flex	P-Flex  R	5	5	5	5	5	5		 	   L	5	5	5	5	5	5			     Sensation: Decreased sensation to vibration at dorsal great toe up to proximal phalanx, but otherwise sensation intact for vibration, temperature, and light touch in all extremities.     Cortical: Extinction on DSS (neglect): none    Reflexes:              Biceps(C5)       BR(C6)     Triceps(C7)               Patellar(L4)    Achilles(S1)    Plantar Resp  R	2	          2	             1		        2		    2		did not move  L	2	          2	             2		        2		    1		Down     Coordination: Perseverance with 3-step Luria. However, did fine with rapid circling of b/l fists clockwise and counter-clockwise direction  No dysmetria to both FTN and HTS    Gait: No postural instability. Normal stance and tandem gait.     LABORATORY:  CBC                       14.8   7.23  )-----------( 215      ( 20 Jul 2021 14:44 )             43.4     Chem 07-20    131<L>  |  97<L>  |  x   ----------------------------<  x   4.0   |  21<L>  |  0.83    Ca    8.8      20 Jul 2021 14:44    TPro  7.0  /  Alb  4.1  /  TBili  0.4  /  DBili  x   /  AST  17  /  ALT  17  /  AlkPhos  89  07-20    LFTs LIVER FUNCTIONS - ( 20 Jul 2021 14:44 )  Alb: 4.1 g/dL / Pro: 7.0 g/dL / ALK PHOS: 89 U/L / ALT: 17 U/L / AST: 17 U/L / GGT: x           Coagulopathy PT/INR - ( 20 Jul 2021 14:44 )   PT: 11.6 sec;   INR: 1.02 ratio         PTT - ( 20 Jul 2021 14:44 )  PTT:28.2 sec      STUDIES & IMAGING:    Radiology (XR, CT, MR, U/S, TTE/MANDEEP):    c< from: CT Brain Stroke Protocol (07.20.21 @ 14:36) >  INTERPRETATION:  Clinical indications: Stroke code.    Multiple axial sections were performed from base of skull to vertex without contrast enhancement. Coronal and sagittal constructions were performed as well    Parenchymal volume loss and chronic microvascular ischemic changes are identified    Old lacunar infarct versus prominent VR space is seen involving the left basal ganglia region    There is no evidence acute hemorrhage mass mass effect seen.    Evaluation of the osseous structures with the appropriate window appears normal    The visualized paranasal sinuses mastoid and middle ear regions appear clear.    IMPRESSION: No evidence acute hemorrhage mass or mass effect.    Findings discussed with neurology resident on July 20, 2021 at 2:38 PM  with read back.    --- End of Report ---      < end of copied text >

## 2021-07-20 NOTE — ED PROVIDER NOTE - OBJECTIVE STATEMENT
80 yo female with no known PMH presented to the ER for evaluation after she reportedly had an episode this morning of dysarthria and right sided weakness, and gait instability. Right arm weakness and speech changes lasted 2 hours and resolved spontaneously, according to family. Grandson is at the bedside assisting with history, but he did not witness the symptoms. Patient reports she is feeling improved now, but not back to her baseline, she still feels off balance as if she has to hold onto the walls to be able to walk steadily. Normally, she is able to ambulate without assistance. They present for evaluation. They deny fever, chills, headache, vision changes, tinnitus, chest pain, SOB, abd pain, n/v/d, or any other acute complaints.

## 2021-07-20 NOTE — H&P ADULT - ASSESSMENT
81F with PMH of gallstone pancreatitis s/p cholecystectomy, RLS here w/ slurred speech and right sided weakness. Concern for CVA. Admitted for stroke workup.

## 2021-07-20 NOTE — STROKE CODE NOTE - DISPOSITION
Other Resolved symptom of R sided weakness. CTH negative. Not tPA candidate due to outside window. Outpt w/u. Discussed with stroke fellow Dr. Correa under stroke service Dr. Fiore./Other Resolved symptom of R sided weakness. CTH negative. Not tPA candidate due to outside window. If inpt, MR brain wo contrast, CTA head and neck. Discussed with stroke fellow Dr. Correa under stroke service Dr. Fiore./Other

## 2021-07-20 NOTE — H&P ADULT - NSHPLABSRESULTS_GEN_ALL_CORE
14.8   7.23  )-----------( 215      ( 20 Jul 2021 14:44 )             43.4     07-20    131<L>  |  97<L>  |  15  ----------------------------<  183<H>  4.0   |  21<L>  |  0.83    Ca    8.8      20 Jul 2021 14:44    TPro  7.0  /  Alb  4.1  /  TBili  0.4  /  DBili  x   /  AST  17  /  ALT  17  /  AlkPhos  89  07-20    PT/INR - ( 20 Jul 2021 14:44 )   PT: 11.6 sec;   INR: 1.02 ratio         PTT - ( 20 Jul 2021 14:44 )  PTT:28.2 sec      RADIOLOGY:  CT head was reviewed. Showed no acute stroke/ICH. Additional findings as below.    < from: CT Brain Stroke Protocol (07.20.21 @ 14:36) >    Old lacunar infarct versus prominent VR space is seen involving the left basal ganglia region    < end of copied text >      EKG: No EKG done

## 2021-07-20 NOTE — H&P ADULT - NSHPPHYSICALEXAM_GEN_ALL_CORE
Vital Signs Last 24 Hrs  T(C): 36.9 (20 Jul 2021 14:07), Max: 36.9 (20 Jul 2021 14:07)  T(F): 98.4 (20 Jul 2021 14:07), Max: 98.4 (20 Jul 2021 14:07)  HR: 72 (20 Jul 2021 14:07) (72 - 72)  BP: 177/89 (20 Jul 2021 14:07) (177/89 - 177/89)  BP(mean): --  RR: 20 (20 Jul 2021 14:07) (20 - 20)  SpO2: 100% (20 Jul 2021 14:07) (100% - 100%)      Gen- In bed, comfortable, NAD  Eyes- EOMI, PERRLA, nonicteric.  EMNT- Fair dentition. MMM. No tonsilar exudates. No posterior pharynx erythema.  Neck- Supple. No masses. No tracheal deviation.  Resp- CTAB, good effort. No r/r/w. No accessory muscle use.  CVS- RRR, S1S2, no g/r/m. No LE edema.  GI- Soft abd, NT, ND, +BSx4. No HSM.  MSK- No C/C. ROM intact. No crepitus.   Neuro- CN II-XII intact. Slightly dysarthric speech. Sensation intact. Strength reduced RLE.  Skin- No rashes/ulcers. Warm/moist.  Psych- AAOx3. Appropriate mood/affect.

## 2021-07-20 NOTE — H&P ADULT - PROBLEM SELECTOR PLAN 1
-Stable. Sx not progressive. Still has slight dysarthria and right sided weakness.  -Appreciate neuro recs. Note reviewed.  -ASA 81/Plavix 300 load x1 NOW. Then start ASA 81 daily + Plavix 75 daily.  -SLP/PT/OT eval.  -NPO now pending dysphagia screen.   -FLP, A1c, TSH in AM. Will also check UA.  -ECHO w/ bubble, MRI, CTA H/N as per neuro - ordered.  -Permissive HTN for now.  -Will monitor FS for now w/ low dose ISS - no known hx of DM.  -Lovenox for DVT ppx.

## 2021-07-20 NOTE — ED PROVIDER NOTE - ATTENDING CONTRIBUTION TO CARE
80yo F denies pmx p/w waking up with feeling off balance this AM (LKN 10pm last night upon going to bed), and family noticed at 5am x 2 hours episode of dysarthria and R arm/leg weakness that has since resolved. Sx have improved but pt still feels mildly off balance. Code stroke activated. Denies fever, chills, cp, sob, abd pain, n/v/d, urinary sx.  VITALS: Initial triage and subsequent vitals have been reviewed by me.  Gen: Well appearing, NAD, alert, non-toxic  Head: NCAT  HEENT: PERRL, MMM, normal conjunctiva, anicteric, neck supple  Lung: CTAB, no adventitious sounds  CV: RRR, no murmurs, 2+symmetric peripheral pulses  Abd: soft, NTND, no rebound or guarding, no palpable masses  MSK: No edema, no visible deformities  Neuro: Following commands appropriately, fluid speech, CN II-XII grossly intact. 5/5 strength and normal sensation in all extremities. Ambulatory with slow but stable gait.  Skin: Warm and dry, no evidence of rash  Psych: normal mood and affect  Code stroke for neuro sx concerning for ongoing stroke v TIA. HDS and neuro exam relatively benign. Stroke w/u and if dx with TIA will consider admission/CDU for high risk for 2nd stroke on ABCD2 risk stratifier.

## 2021-07-21 LAB
A1C WITH ESTIMATED AVERAGE GLUCOSE RESULT: 5.6 % — SIGNIFICANT CHANGE UP (ref 4–5.6)
ANION GAP SERPL CALC-SCNC: 14 MMOL/L — SIGNIFICANT CHANGE UP (ref 7–14)
BUN SERPL-MCNC: 12 MG/DL — SIGNIFICANT CHANGE UP (ref 7–23)
CALCIUM SERPL-MCNC: 9.1 MG/DL — SIGNIFICANT CHANGE UP (ref 8.4–10.5)
CHLORIDE SERPL-SCNC: 102 MMOL/L — SIGNIFICANT CHANGE UP (ref 98–107)
CHOLEST SERPL-MCNC: 183 MG/DL — SIGNIFICANT CHANGE UP
CO2 SERPL-SCNC: 24 MMOL/L — SIGNIFICANT CHANGE UP (ref 22–31)
COVID-19 SPIKE DOMAIN AB INTERP: POSITIVE
COVID-19 SPIKE DOMAIN ANTIBODY RESULT: >250 U/ML — HIGH
CREAT SERPL-MCNC: 0.87 MG/DL — SIGNIFICANT CHANGE UP (ref 0.5–1.3)
ESTIMATED AVERAGE GLUCOSE: 114 — SIGNIFICANT CHANGE UP
GLUCOSE BLDC GLUCOMTR-MCNC: 100 MG/DL — HIGH (ref 70–99)
GLUCOSE SERPL-MCNC: 104 MG/DL — HIGH (ref 70–99)
HDLC SERPL-MCNC: 62 MG/DL — SIGNIFICANT CHANGE UP
LIPID PNL WITH DIRECT LDL SERPL: 101 MG/DL — HIGH
NON HDL CHOLESTEROL: 121 MG/DL — SIGNIFICANT CHANGE UP
POTASSIUM SERPL-MCNC: 3.5 MMOL/L — SIGNIFICANT CHANGE UP (ref 3.5–5.3)
POTASSIUM SERPL-SCNC: 3.5 MMOL/L — SIGNIFICANT CHANGE UP (ref 3.5–5.3)
SARS-COV-2 IGG+IGM SERPL QL IA: >250 U/ML — HIGH
SARS-COV-2 IGG+IGM SERPL QL IA: POSITIVE
SODIUM SERPL-SCNC: 140 MMOL/L — SIGNIFICANT CHANGE UP (ref 135–145)
TRIGL SERPL-MCNC: 98 MG/DL — SIGNIFICANT CHANGE UP
TSH SERPL-MCNC: 1.31 UIU/ML — SIGNIFICANT CHANGE UP (ref 0.27–4.2)

## 2021-07-21 PROCEDURE — 93306 TTE W/DOPPLER COMPLETE: CPT | Mod: 26

## 2021-07-21 RX ORDER — ATORVASTATIN CALCIUM 80 MG/1
40 TABLET, FILM COATED ORAL AT BEDTIME
Refills: 0 | Status: DISCONTINUED | OUTPATIENT
Start: 2021-07-21 | End: 2021-07-24

## 2021-07-21 RX ADMIN — Medication 81 MILLIGRAM(S): at 11:59

## 2021-07-21 RX ADMIN — ATORVASTATIN CALCIUM 40 MILLIGRAM(S): 80 TABLET, FILM COATED ORAL at 21:23

## 2021-07-21 RX ADMIN — Medication 2000 UNIT(S): at 12:00

## 2021-07-21 RX ADMIN — ENOXAPARIN SODIUM 40 MILLIGRAM(S): 100 INJECTION SUBCUTANEOUS at 12:04

## 2021-07-21 RX ADMIN — CLOPIDOGREL BISULFATE 75 MILLIGRAM(S): 75 TABLET, FILM COATED ORAL at 12:02

## 2021-07-21 NOTE — PHYSICAL THERAPY INITIAL EVALUATION ADULT - PATIENT PROFILE REVIEW, REHAB EVAL
Activity - OOB with assistance. Spoke with Renata WELLS RN, pt. ok to be seen for PT Evaluation./yes

## 2021-07-21 NOTE — PHYSICAL THERAPY INITIAL EVALUATION ADULT - GAIT DEVIATIONS NOTED, PT EVAL
decreased peewee/increased time in double stance/decreased step length/decreased stride length/decreased weight-shifting ability

## 2021-07-21 NOTE — SPEECH LANGUAGE PATHOLOGY EVALUATION - COMMENTS
Consult received and chart reviewed. Patient seen at bedside this afternoon for an initial assessment of speech/language, at which time patient was alert and cooperative. Patient also seen for a swallow assessment at this time (please see full report for details), at which mechanical soft solids and nectar thick liquids was recommended. Patient's daughters present during today's assessments. Per patient's daughters, patient's primary language is Per H&P report, patient is a "81F with PMH of RLS, gallstone pancreatitis brought in by family w/ slurred speech and right sided weakness. Around 530AM, pt felt dizzy. No room spinning. Dizziness seemed ot have resolved as the day progressed. Later in the morning, the patient noticed right sided weakness causing her to have difficulty ambulating around the house and use of her upper extremities. Sx were persistent and did not improve. She was then brought to the hospital for further evaluation. Otherwise, no recent illnesses, sick contacts, F/C, N/V, CP, palps, abd pain, diarrhea, dy"    B[place]udian, however, pt is fluent in English and code switches at baseline. Pt's daughter's declined  and provided translation at times. Patient's daughters reporting patient intermittently exhibits "slurred speech" that "comes and goes". Patient's daughters further report pt does not have any word finding difficulty/difficulty understanding at this time. 1.) Patient may benefit from speech evaluation/therapy pending discharge plans (rehabilitation center vs home care vs outpatient vs [facility] [phone]). Pt occasionally responded in Algerian, however, daughter provided translation as needed. Per H&P report, patient is a "81F with PMH of RLS, gallstone pancreatitis brought in by family w/ slurred speech and right sided weakness. Around 530AM, pt felt dizzy. No room spinning. Dizziness seemed ot have resolved as the day progressed. Later in the morning, the patient noticed right sided weakness causing her to have difficulty ambulating around the house and use of her upper extremities. Sx were persistent and did not improve. She was then brought to the hospital for further evaluation. Otherwise, no recent illnesses, sick contacts, F/C, N/V, CP, palps, abd pain, diarrhea, dy"    Consult received and chart reviewed. Patient seen at bedside this afternoon for an initial assessment of speech/language, at which time patient was alert and cooperative. Patient also seen for a swallow assessment at this time (please see full report for details), at which mechanical soft solids and nectar thick liquids was recommended. Patient's daughters present during today's assessments. Per patient's daughters, patient's primary language is Ghanaian, however, pt is fluent in English and code switches at baseline. Pt's daughter's declined  and provided translation at times. Patient's daughters reporting patient intermittently exhibits "slurred speech" that "comes and goes". Patient's daughters further report pt does not have any word finding difficulty/difficulty understanding at this time. Per H&P report, patient is a "81F with PMH of RLS, gallstone pancreatitis brought in by family w/ slurred speech and right sided weakness. Around 530AM, pt felt dizzy. No room spinning. Dizziness seemed ot have resolved as the day progressed. Later in the morning, the patient noticed right sided weakness causing her to have difficulty ambulating around the house and use of her upper extremities. Sx were persistent and did not improve. She was then brought to the hospital for further evaluation. Otherwise, no recent illnesses, sick contacts, F/C, N/V, CP, palps, abd pain, diarrhea, dy"    Consult received and chart reviewed. Patient seen at bedside this afternoon for an initial assessment of speech/language, at which time patient was alert and cooperative. Patient also seen for a swallow assessment at this time (please see full report for details), at which mechanical soft solids and nectar thick liquids was recommended. Patient's daughters present during today's assessments. Per patient's daughters, patient's primary language is Peruvian, however, pt is fluent in English and code switches at baseline. Pt's daughter's declined  and provided translation during times of code switching. Patient's daughters reporting patient intermittently exhibits "slurred speech" that "comes and goes". Patient's daughters further report pt does not have any word finding difficulty/difficulty understanding at this time.

## 2021-07-21 NOTE — SWALLOW BEDSIDE ASSESSMENT ADULT - SWALLOW EVAL: DIAGNOSIS
1. Patient demonstrated a functional oral management for nectar and thin liquid textures marked by adequate bolus acceptance, transfer, and posterior transport. 2. Patient demonstrated a mild oral dysphagia for puree marked by adequate bolus stripping off spoon utensil with pt exhibiting volitional bolus hold with adequate transport. 3.Patient demonstrated a mild oral dysphagia for solids marked by adequate bolus acceptance with slow chewing resulting in delayed transport. 4. Patient demonstrated a functional pharyngeal phase of swallow for puree, solids and nectar thick liquids marked by a timely pharyngeal swallow trigger with hyolaryngeal elevation noted upon digital palpation without evidence of airway penetration/aspiration. 5. Patient demonstrated a moderate/severe pharyngeal dysphagia for thin liquids marked by a delayed pharyngeal swallow trigger with hyolaryngeal elevation noted upon digital palpation with a change in vocal quality to a 'wet' tone suggestive of airway

## 2021-07-21 NOTE — SWALLOW BEDSIDE ASSESSMENT ADULT - ADDITIONAL RECOMMENDATIONS
1) Monitor for any changes in neurological status that may impact oral intake. 2) Reconsult this department as needed.

## 2021-07-21 NOTE — PHYSICAL THERAPY INITIAL EVALUATION ADULT - ADDITIONAL COMMENTS
Pt. was left in bed post PT Evaluation, no apparent distress, all lines intact, call mckinnon within reach. Renata WILLIAM made aware of pt. participation in PT.

## 2021-07-21 NOTE — PROGRESS NOTE ADULT - SUBJECTIVE AND OBJECTIVE BOX
DATE OF SERVICE:    Patient was seen and examined on     .Interim events noted.Consultant notes ,Labs,Telemetry reviewed by me    PRESENTING CC:    HPI and HOSPITAL COURSE: HPI:  81F with PMH of RLS, gallstone pancreatitis brought in by family w/ slurred speech and right sided weakness. Around 530AM, pt felt dizzy. No room spinning. Dizziness seemed ot have resolved as the day progressed. Later in the morning, the patient noticed right sided weakness causing her to have difficulty ambulating around the house and use of her upper extremities. Sx were persistent and did not improve. She was then brought to the hospital for further evaluation. Otherwise, no recent illnesses, sick contacts, F/C, N/V, CP, palps, abd pain, diarrhea, dysuria.     ED course: 177/89, 72, 20, 98.4F, 100% RA. Stroke code.      (20 Jul 2021 18:40)      INTERIM EVENTS:      PMH -reviewed admission note, no change since admission  Heart Failure: Acute [ ] Chronic [ ] Acute on Chronic [ ] Diastolic [ ] Systolic [ ] Combined Systolic and Diastolic[ ]  LILIAN[ ]  ATN[ ]  CKD I [ ] CKDII [ ] CKD III [ ] CKD IV [ ] CKD V [ ] ESRD[ ]  HTN[ ] CVA[ ] DM[ ] COPD[ ] COVID[ ] AF[ ]  PPM[ ] ICD[ ]    MEDICATIONS  (STANDING):  aspirin enteric coated 81 milliGRAM(s) Oral daily  atorvastatin 80 milliGRAM(s) Oral at bedtime  cholecalciferol 2000 Unit(s) Oral daily  clopidogrel Tablet 75 milliGRAM(s) Oral daily  enoxaparin Injectable 40 milliGRAM(s) SubCutaneous daily    MEDICATIONS  (PRN):            REVIEW OF SYSTEMS:  Constitutional: [ ] fever, [ ]weight loss,  [ ]fatigue  Eyes: [ ] visual changes  Respiratory: [ ]shortness of breath;  [ ] cough, [ ]wheezing, [ ]chills, [ ]hemoptysis  Cardiovascular: [ ] chest pain, [ ]palpitations, [ ]dizziness,  [ ]leg swelling[ ]orthopnea[ ]PND  Gastrointestinal: [ ] abdominal pain, [ ]nausea, [ ]vomiting,  [ ]diarrhea [ ]Constipation [ ]Melena  Genitourinary: [ ] dysuria, [ ] hematuria [ ]Blanchard  Neurologic: [ ] headaches [ ] tremors[ ]weakness [ ]Paralysis Right[ ] Left[ ]  Skin: [ ] itching, [ ]burning, [ ] rashes  Endocrine: [ ] heat or cold intolerance  Musculoskeletal: [ ] joint pain or swelling; [ ] muscle, back, or extremity pain  Psychiatric: [ ] depression, [ ]anxiety, [ ]mood swings, or [ ]difficulty sleeping  Hematologic: [ ] easy bruising, [ ] bleeding gums    [x] All remaining systems negative except as per above.   [ ]Unable to obtain.    Vital Signs Last 24 Hrs  T(C): 36.8 (21 Jul 2021 12:14), Max: 36.8 (21 Jul 2021 05:13)  T(F): 98.2 (21 Jul 2021 12:14), Max: 98.2 (21 Jul 2021 05:13)  HR: 72 (21 Jul 2021 12:14) (64 - 72)  BP: 155/72 (21 Jul 2021 12:14) (148/77 - 159/71)  BP(mean): --  RR: 17 (21 Jul 2021 12:14) (17 - 18)  SpO2: 97% (21 Jul 2021 12:14) (97% - 100%)  I&O's Summary      PHYSICAL EXAM:  General: No acute distress BMI-  HEENT: EOMI, PERRL  Neck: Supple, [ ] JVD  Lungs: Equal air entry bilaterally; [ ] rales [ ] wheezing [ ] rhonchi  Heart: Regular rate and rhythm; [ ] murmur   /6 [ ] systolic [ ] diastolic [ ] radiation[ ] rubs [ ]  gallops  Abdomen: Nontender, bowel sounds present  Extremities: No clubbing, cyanosis, [ ] edema [ ]Pulses  equal and intact  Nervous system:  Alert & Oriented X3, no focal deficits  Psychiatric: Normal affect  Skin: No rashes or lesions    LABS:  07-21    140  |  102  |  12  ----------------------------<  104<H>  3.5   |  24  |  0.87    Ca    9.1      21 Jul 2021 06:29    TPro  7.0  /  Alb  4.1  /  TBili  0.4  /  DBili  x   /  AST  17  /  ALT  17  /  AlkPhos  89  07-20    Creatinine Trend: 0.87<--, 0.83<--                        14.8   7.23  )-----------( 215      ( 20 Jul 2021 14:44 )             43.4     PT/INR - ( 20 Jul 2021 14:44 )   PT: 11.6 sec;   INR: 1.02 ratio         PTT - ( 20 Jul 2021 14:44 )  PTT:28.2 sec    Cardiac Enzymes:           RADIOLOGY:    ECG [my interpretation]:    TELEMETRY:Reviewed monitor tracings-    ECHO:    STRESS TEST:    CATHETERIZATION:      IMPRESSION AND PLAN:       DATE OF SERVICE:  07/21/2021  Patient was seen and examined on  07/21/2021   .Interim events noted.Consultant notes ,Labs,Telemetry reviewed by me  Slurred speech  PRESENTING CC:    HPI and HOSPITAL COURSE: HPI:  81F with PMH of RLS, gallstone pancreatitis brought in by family w/ slurred speech and right sided weakness. Around 530AM, pt felt dizzy. No room spinning. Dizziness seemed ot have resolved as the day progressed. Later in the morning, the patient noticed right sided weakness causing her to have difficulty ambulating around the house and use of her upper extremities. Sx were persistent and did not improve. She was then brought to the hospital for further evaluation. Otherwise, no recent illnesses, sick contacts, F/C, N/V, CP, palps, abd pain, diarrhea, dysuria.     ED course: 177/89, 72, 20, 98.4F, 100% RA. Stroke code.         INTERIM EVENTS:Awake alert speech more coherent      PMH -reviewed admission note, no change since admission  Heart Failure: Acute [ ] Chronic [ ] Acute on Chronic [ ] Diastolic [ ] Systolic [ ] Combined Systolic and Diastolic[ ]  LILIAN[ ]  ATN[ ]  CKD I [ ] CKDII [ ] CKD III [ ] CKD IV [ ] CKD V [ ] ESRD[ ]  HTN[ ] CVA[ ] DM[ ] COPD[ ] COVID[ ] AF[ ]  PPM[ ] ICD[ ]    MEDICATIONS  (STANDING):  aspirin enteric coated 81 milliGRAM(s) Oral daily  atorvastatin 80 milliGRAM(s) Oral at bedtime  cholecalciferol 2000 Unit(s) Oral daily  clopidogrel Tablet 75 milliGRAM(s) Oral daily  enoxaparin Injectable 40 milliGRAM(s) SubCutaneous daily            REVIEW OF SYSTEMS:  Constitutional: [ ] fever, [ ]weight loss,  [ ]fatigue  Eyes: [ ] visual changes  Respiratory: [ ]shortness of breath;  [ ] cough, [ ]wheezing, [ ]chills, [ ]hemoptysis  Cardiovascular: [ ] chest pain, [ ]palpitations, [ ]dizziness,  [ ]leg swelling[ ]orthopnea[ ]PND  Gastrointestinal: [ ] abdominal pain, [ ]nausea, [ ]vomiting,  [ ]diarrhea [ ]Constipation [ ]Melena  Genitourinary: [ ] dysuria, [ ] hematuria [ ]Blanchard  Neurologic: [ ] headaches [ ] tremors[x ]weakness [ ]Paralysis Right[ ] Left[ ]  Skin: [ ] itching, [ ]burning, [ ] rashes  Endocrine: [ ] heat or cold intolerance  Musculoskeletal: [ ] joint pain or swelling; [ ] muscle, back, or extremity pain  Psychiatric: [ ] depression, [ ]anxiety, [ ]mood swings, or [ ]difficulty sleeping  Hematologic: [ ] easy bruising, [ ] bleeding gums    [x] All remaining systems negative except as per above.   [ ]Unable to obtain.    Vital Signs Last 24 Hrs  T(C): 36.8 (21 Jul 2021 12:14), Max: 36.8 (21 Jul 2021 05:13)  T(F): 98.2 (21 Jul 2021 12:14), Max: 98.2 (21 Jul 2021 05:13)  HR: 72 (21 Jul 2021 12:14) (64 - 72)  BP: 155/72 (21 Jul 2021 12:14) (148/77 - 159/71)  RR: 17 (21 Jul 2021 12:14) (17 - 18)  SpO2: 97% (21 Jul 2021 12:14) (97% - 100%)  I&O's Summary      PHYSICAL EXAM:  General: No acute distress BMI-26  HEENT: EOMI, PERRL  Neck: Supple, [ ] JVD  Lungs: Equal air entry bilaterally; [ ] rales [ ] wheezing [ ] rhonchi  Heart: Regular rate and rhythm; [x ] murmur   2/6 [x ] systolic [ ] diastolic [ ] radiation[ ] rubs [ ]  gallops  Abdomen: Nontender, bowel sounds present  Extremities: No clubbing, cyanosis, [ ] edema [ ]Pulses  equal and intact  Nervous system:  Alert & Oriented X3, no focal deficits  Psychiatric: Normal affect  Skin: No rashes or lesions    LABS:  07-21    140  |  102  |  12  ----------------------------<  104<H>  3.5   |  24  |  0.87    Ca    9.1      21 Jul 2021 06:29    TPro  7.0  /  Alb  4.1  /  TBili  0.4  /  DBili  x   /  AST  17  /  ALT  17  /  AlkPhos  89  07-20    Creatinine Trend: 0.87<--, 0.83<--                        14.8   7.23  )-----------( 215      ( 20 Jul 2021 14:44 )             43.4     PT/INR - ( 20 Jul 2021 14:44 )   PT: 11.6 sec;   INR: 1.02 ratio         PTT - ( 20 Jul 2021 14:44 )  PTT:28.2 sec    Cardiac Enzymes:           IMPRESSION AND PLAN:      81F with PMH of gallstone pancreatitis s/p cholecystectomy, RLS here w/ slurred speech and right sided weakness. Concern for CVA. Admitted for stroke workup.     Problem/Plan - 1:  ·  Problem: R/O Stroke.  Plan: -Stable. Sx not progressive. Still has slight dysarthria and right sided weakness.  -Appreciate neuro recs. Note reviewed.  -aspirin, echo   .     Problem/Plan - 2:  ·  Problem: Hyponatremia.  Plan: Monitor for now. Encourage PO intake once cleared to take by mouth.

## 2021-07-21 NOTE — OCCUPATIONAL THERAPY INITIAL EVALUATION ADULT - DIAGNOSIS, OT EVAL
s/p slurred speech, s/p right sided weakness, s/p r/o CVA; decreased functional mobility, decreased ADL performance, right UE weakness, decreased right UE coordination

## 2021-07-21 NOTE — SWALLOW BEDSIDE ASSESSMENT ADULT - COMMENTS
Per H&P report, patient is a "81F with PMH of RLS, gallstone pancreatitis brought in by family w/ slurred speech and right sided weakness. Around 530AM, pt felt dizzy. No room spinning. Dizziness seemed ot have resolved as the day progressed. Later in the morning, the patient noticed right sided weakness causing her to have difficulty ambulating around the house and use of her upper extremities. Sx were persistent and did not improve. She was then brought to the hospital for further evaluation. Otherwise, no recent illnesses, sick contacts, F/C, N/V, CP, palps, abd pain, diarrhea, dy"    Consult received and chart reviewed. Patient seen at bedside this afternoon for an initial assessment of the swallow function, at which time patient was alert and cooperative. Patient also seen for a speech/language assessment at this time (please see full report for details). Patient's daughter's present during today's assessment. Per patient's daughter's report, patient consumes soft solids and thin liquids at baseline. RN reporting patient coughed with thin liquids. Pt able to follow directives and was verbally responsive during today's assessment.    WBC is WFL. Most recent CT angio of head revealed "CT angiogram of the neck demonstrates no significant stenosis CTA of the [place] demonstrates no significant stenosis or aneurysms."

## 2021-07-21 NOTE — PROGRESS NOTE ADULT - SUBJECTIVE AND OBJECTIVE BOX
SUBJECTIVE / OVERNIGHT EVENTS: pt seen and examined       MEDICATIONS  (STANDING):  aspirin enteric coated 81 milliGRAM(s) Oral daily  atorvastatin 40 milliGRAM(s) Oral at bedtime  cholecalciferol 2000 Unit(s) Oral daily  clopidogrel Tablet 75 milliGRAM(s) Oral daily  enoxaparin Injectable 40 milliGRAM(s) SubCutaneous daily    MEDICATIONS  (PRN):    Vital Signs Last 24 Hrs  T(C): 36.7 (21 Jul 2021 21:20), Max: 36.8 (21 Jul 2021 05:13)  T(F): 98.1 (21 Jul 2021 21:20), Max: 98.2 (21 Jul 2021 05:13)  HR: 70 (21 Jul 2021 21:20) (64 - 72)  BP: 155/57 (21 Jul 2021 21:20) (155/57 - 159/71)  BP(mean): --  RR: 17 (21 Jul 2021 21:20) (17 - 18)  SpO2: 98% (21 Jul 2021 21:20) (97% - 100%)    CAPILLARY BLOOD GLUCOSE      POCT Blood Glucose.: 100 mg/dL (21 Jul 2021 08:49)    I&O's Summary      Constitutional: No fever, fatigue  Skin: No rash.  Eyes: No recent vision problems or eye pain.  ENT: No congestion, ear pain, or sore throat.  Cardiovascular: No chest pain or palpation.  Respiratory: No cough, shortness of breath, congestion, or wheezing.  Gastrointestinal: No abdominal pain, nausea, vomiting, or diarrhea.  Genitourinary: No dysuria.  Musculoskeletal: No joint swelling.  Neurologic: No headache.    PHYSICAL EXAM:  GENERAL: NAD  EYES: EOMI, PERRLA  NECK: Supple, No JVD  CHEST/LUNG: dec breath sounds at bases  HEART:  S1 , S2 +  ABDOMEN: soft , bs+  EXTREMITIES:  trace edema  NEUROLOGY:alert awake      LABS:                        14.8   7.23  )-----------( 215      ( 20 Jul 2021 14:44 )             43.4     07-21    140  |  102  |  12  ----------------------------<  104<H>  3.5   |  24  |  0.87    Ca    9.1      21 Jul 2021 06:29    TPro  7.0  /  Alb  4.1  /  TBili  0.4  /  DBili  x   /  AST  17  /  ALT  17  /  AlkPhos  89  07-20    PT/INR - ( 20 Jul 2021 14:44 )   PT: 11.6 sec;   INR: 1.02 ratio         PTT - ( 20 Jul 2021 14:44 )  PTT:28.2 sec          RADIOLOGY & ADDITIONAL TESTS:    Imaging Personally Reviewed:    Consultant(s) Notes Reviewed:      Care Discussed with Consultants/Other Providers:

## 2021-07-21 NOTE — PHYSICAL THERAPY INITIAL EVALUATION ADULT - PERTINENT HX OF CURRENT PROBLEM, REHAB EVAL
Pt. admitted for slurred speech, right sided weakness. Per radiology report, CT head revealed no evidence acute hemorrhage mass or mass effect.

## 2021-07-21 NOTE — OCCUPATIONAL THERAPY INITIAL EVALUATION ADULT - PERTINENT HX OF CURRENT PROBLEM, REHAB EVAL
81 year old female with history of gallstone pancreatitis s/p cholecystectomy, RLS presenting with slurred speech and right sided weakness. Admitted to r/o CVA. CT brain negative for acute pathology.

## 2021-07-21 NOTE — PROVIDER CONTACT NOTE (OTHER) - ASSESSMENT
Patient had a late lunch and is refusing lunch time fingerstick. Patient states will get fingerstick checked when she is ready to eat lunch .

## 2021-07-21 NOTE — SPEECH LANGUAGE PATHOLOGY EVALUATION - SLP DIAGNOSIS
Patient demonstrated adequate expressive and receptive language skills characterized by fluent speech, good auditory comprehension skills, good repetition and intact naming skills. Patient demonstrated mild speech deficits characterized by inconsistent diadochokinetic rate (/pa, ta, ka/ and /pataka/), however, adequate oral movement for words with increasing phonemic complexity and appropriate rate, volume and prosody. Patient demonstrated adequate expressive and receptive language skills characterized by fluent verbal output at the sentence level, adequate auditory comprehension skills, adequate repetition and with no anomia demonsrated. Patient demonstrated functional motor speech characterized by inconsistent diadochokinetic rate and adequate oral movement for words with increasing phonemic complexity and appropriate rate, volume and prosody.

## 2021-07-21 NOTE — PROVIDER CONTACT NOTE (OTHER) - ACTION/TREATMENT ORDERED:
ACP Lisa made aware. Patient educated to notify staff before she is going to eat lunch so fingerstick can be done.

## 2021-07-22 LAB
ALBUMIN SERPL ELPH-MCNC: 3.7 G/DL — SIGNIFICANT CHANGE UP (ref 3.3–5)
ALP SERPL-CCNC: 79 U/L — SIGNIFICANT CHANGE UP (ref 40–120)
ALT FLD-CCNC: 16 U/L — SIGNIFICANT CHANGE UP (ref 4–33)
ANION GAP SERPL CALC-SCNC: 13 MMOL/L — SIGNIFICANT CHANGE UP (ref 7–14)
AST SERPL-CCNC: 19 U/L — SIGNIFICANT CHANGE UP (ref 4–32)
BASOPHILS # BLD AUTO: 0.03 K/UL — SIGNIFICANT CHANGE UP (ref 0–0.2)
BASOPHILS NFR BLD AUTO: 0.4 % — SIGNIFICANT CHANGE UP (ref 0–2)
BILIRUB SERPL-MCNC: 0.6 MG/DL — SIGNIFICANT CHANGE UP (ref 0.2–1.2)
BUN SERPL-MCNC: 16 MG/DL — SIGNIFICANT CHANGE UP (ref 7–23)
CALCIUM SERPL-MCNC: 9.1 MG/DL — SIGNIFICANT CHANGE UP (ref 8.4–10.5)
CHLORIDE SERPL-SCNC: 106 MMOL/L — SIGNIFICANT CHANGE UP (ref 98–107)
CO2 SERPL-SCNC: 21 MMOL/L — LOW (ref 22–31)
CREAT SERPL-MCNC: 0.95 MG/DL — SIGNIFICANT CHANGE UP (ref 0.5–1.3)
EOSINOPHIL # BLD AUTO: 0.17 K/UL — SIGNIFICANT CHANGE UP (ref 0–0.5)
EOSINOPHIL NFR BLD AUTO: 2.5 % — SIGNIFICANT CHANGE UP (ref 0–6)
GLUCOSE BLDC GLUCOMTR-MCNC: 76 MG/DL — SIGNIFICANT CHANGE UP (ref 70–99)
GLUCOSE BLDC GLUCOMTR-MCNC: 94 MG/DL — SIGNIFICANT CHANGE UP (ref 70–99)
GLUCOSE SERPL-MCNC: 93 MG/DL — SIGNIFICANT CHANGE UP (ref 70–99)
HCT VFR BLD CALC: 44.4 % — SIGNIFICANT CHANGE UP (ref 34.5–45)
HGB BLD-MCNC: 14.8 G/DL — SIGNIFICANT CHANGE UP (ref 11.5–15.5)
IANC: 4.07 K/UL — SIGNIFICANT CHANGE UP (ref 1.5–8.5)
IMM GRANULOCYTES NFR BLD AUTO: 0.3 % — SIGNIFICANT CHANGE UP (ref 0–1.5)
LYMPHOCYTES # BLD AUTO: 1.89 K/UL — SIGNIFICANT CHANGE UP (ref 1–3.3)
LYMPHOCYTES # BLD AUTO: 28 % — SIGNIFICANT CHANGE UP (ref 13–44)
MCHC RBC-ENTMCNC: 28.7 PG — SIGNIFICANT CHANGE UP (ref 27–34)
MCHC RBC-ENTMCNC: 33.3 GM/DL — SIGNIFICANT CHANGE UP (ref 32–36)
MCV RBC AUTO: 86.2 FL — SIGNIFICANT CHANGE UP (ref 80–100)
MONOCYTES # BLD AUTO: 0.58 K/UL — SIGNIFICANT CHANGE UP (ref 0–0.9)
MONOCYTES NFR BLD AUTO: 8.6 % — SIGNIFICANT CHANGE UP (ref 2–14)
NEUTROPHILS # BLD AUTO: 4.07 K/UL — SIGNIFICANT CHANGE UP (ref 1.8–7.4)
NEUTROPHILS NFR BLD AUTO: 60.2 % — SIGNIFICANT CHANGE UP (ref 43–77)
NRBC # BLD: 0 /100 WBCS — SIGNIFICANT CHANGE UP
NRBC # FLD: 0 K/UL — SIGNIFICANT CHANGE UP
PLATELET # BLD AUTO: 220 K/UL — SIGNIFICANT CHANGE UP (ref 150–400)
POTASSIUM SERPL-MCNC: 3.7 MMOL/L — SIGNIFICANT CHANGE UP (ref 3.5–5.3)
POTASSIUM SERPL-SCNC: 3.7 MMOL/L — SIGNIFICANT CHANGE UP (ref 3.5–5.3)
PROT SERPL-MCNC: 6.5 G/DL — SIGNIFICANT CHANGE UP (ref 6–8.3)
RBC # BLD: 5.15 M/UL — SIGNIFICANT CHANGE UP (ref 3.8–5.2)
RBC # FLD: 12.5 % — SIGNIFICANT CHANGE UP (ref 10.3–14.5)
SODIUM SERPL-SCNC: 140 MMOL/L — SIGNIFICANT CHANGE UP (ref 135–145)
WBC # BLD: 6.76 K/UL — SIGNIFICANT CHANGE UP (ref 3.8–10.5)
WBC # FLD AUTO: 6.76 K/UL — SIGNIFICANT CHANGE UP (ref 3.8–10.5)

## 2021-07-22 PROCEDURE — 70551 MRI BRAIN STEM W/O DYE: CPT | Mod: 26

## 2021-07-22 RX ADMIN — ENOXAPARIN SODIUM 40 MILLIGRAM(S): 100 INJECTION SUBCUTANEOUS at 15:04

## 2021-07-22 RX ADMIN — CLOPIDOGREL BISULFATE 75 MILLIGRAM(S): 75 TABLET, FILM COATED ORAL at 15:04

## 2021-07-22 RX ADMIN — ATORVASTATIN CALCIUM 40 MILLIGRAM(S): 80 TABLET, FILM COATED ORAL at 21:25

## 2021-07-22 RX ADMIN — Medication 81 MILLIGRAM(S): at 15:03

## 2021-07-22 RX ADMIN — Medication 2000 UNIT(S): at 15:03

## 2021-07-22 NOTE — PROGRESS NOTE ADULT - SUBJECTIVE AND OBJECTIVE BOX
Neurology Progress Note    S: Patient seen and examined. family at bedside. MRI confirms infarct     Medication:  aspirin enteric coated 81 milliGRAM(s) Oral daily  atorvastatin 40 milliGRAM(s) Oral at bedtime  cholecalciferol 2000 Unit(s) Oral daily  clopidogrel Tablet 75 milliGRAM(s) Oral daily  enoxaparin Injectable 40 milliGRAM(s) SubCutaneous daily      Vitals:  Vital Signs Last 24 Hrs  T(C): 36.8 (22 Jul 2021 06:48), Max: 36.8 (21 Jul 2021 12:14)  T(F): 98.3 (22 Jul 2021 06:48), Max: 98.3 (22 Jul 2021 06:48)  HR: 72 (22 Jul 2021 06:48) (70 - 72)  BP: 142/73 (22 Jul 2021 06:48) (142/73 - 155/72)  BP(mean): --  RR: 18 (22 Jul 2021 06:48) (17 - 18)  SpO2: 98% (22 Jul 2021 06:48) (97% - 98%)    General Exam:   General Appearance: Appropriately dressed and in no acute distress       Head: Normocephalic, atraumatic and no dysmorphic features  Ear, Nose, and Throat: Moist mucous membranes  CVS: S1S2+  Resp: No SOB, no wheeze or rhonchi  Abd: soft NTND  Extremities: No edema, no cyanosis  Skin: No bruises, no rashes     Neurological Exam:  Mental Status: Awake, alert and oriented x 2.  Able to follow simple and complex verbal commands. Able to name and repeat. fluent speech. No obvious aphasia  + dysarthria noted.   Cranial Nerves: PERRL, EOMI, VFFC, sensation V1-V3 intact,  R facial asymmetry , equal elevation of palate, scm/trap 5/5, tongue is midline on protrusion. no obvious papilledema on fundoscopic exam. Hearing is grossly intact.   Motor: Normal bulk, tone and strength throughout. L. RUE 3/5, RLE 4/5    Sensation: Intact to light touch and pinprick throughout. no right/left confusion. no extinction to tactile on DSS.    Reflexes: 1+ throughout at biceps, brachioradialis, triceps, patellars and ankles bilaterally and equal. No clonus. R toe and L toe were both downgoing.  Coordination: unable   Gait: unalbe     I personally reviewed the below data/images/labs:      CBC Full  -  ( 22 Jul 2021 08:06 )  WBC Count : 6.76 K/uL  RBC Count : 5.15 M/uL  Hemoglobin : 14.8 g/dL  Hematocrit : 44.4 %  Platelet Count - Automated : 220 K/uL  Mean Cell Volume : 86.2 fL  Mean Cell Hemoglobin : 28.7 pg  Mean Cell Hemoglobin Concentration : 33.3 gm/dL  Auto Neutrophil # : 4.07 K/uL  Auto Lymphocyte # : 1.89 K/uL  Auto Monocyte # : 0.58 K/uL  Auto Eosinophil # : 0.17 K/uL  Auto Basophil # : 0.03 K/uL  Auto Neutrophil % : 60.2 %  Auto Lymphocyte % : 28.0 %  Auto Monocyte % : 8.6 %  Auto Eosinophil % : 2.5 %  Auto Basophil % : 0.4 %      07-22    140  |  106  |  16  ----------------------------<  93  3.7   |  21<L>  |  0.95    Ca    9.1      22 Jul 2021 08:06    TPro  6.5  /  Alb  3.7  /  TBili  0.6  /  DBili  x   /  AST  19  /  ALT  16  /  AlkPhos  79  07-22    c< from: CT Brain Stroke Protocol (07.20.21 @ 14:36) >  INTERPRETATION:  Clinical indications: Stroke code.    Multiple axial sections were performed from base of skull to vertex without contrast enhancement. Coronal and sagittal constructions were performed as well    Parenchymal volume loss and chronic microvascular ischemic changes are identified    Old lacunar infarct versus prominent VR space is seen involving the left basal ganglia region    There is no evidence acute hemorrhage mass mass effect seen.    Evaluation of the osseous structures with the appropriate window appears normal    The visualized paranasal sinuses mastoid and middle ear regions appear clear.    IMPRESSION: No evidence acute hemorrhage mass or mass effect.    Findings discussed with neurology resident on July 20, 2021 at 2:38 PM  with read back.    --- End of Report ---      < end of copied text >      < from: MR Head No Cont (07.22.21 @ 02:01) >    EXAM:  MR BRAIN        PROCEDURE DATE:  Jul 22 2021         INTERPRETATION:  Clinical indication: Stroke code.    MRI of the brain was performed using sagittal T1, axial T1 T2 T2 FLAIR diffusion and susceptibility weighted sequence.    Parenchymal volume loss and chronic microvascular ischemic changes are identified    There is abnormal T2 prolongation with restricted diffusion seen involving left nickolas. This compatible with an acute infarct. No hemorrhagic transformation is seen. No significantshift or herniation is seen    The large vessels demonstrate normal flow voids    The visualized paranasal sinuses mastoid and middle ear regions appear clear.    IMPRESSION: Acute infarct involving the left nickolas.    --- End of Report ---              JOSE ARMANDO GALAVIZ MD; Attending Radiologist  This document has been electronically signed. Jul 22 2021  9:24AM    < end of copied text >\  \  Patient name: ABDELRAHMAN CASTELLANOS  YOB: 1940   Age: 81 (F)   MR#: [id number]  Study Date: 7/21/2021  [facility]  Study quality: Technically good  Referring Physician: Refugio Russo MD  Blood Pressure: 155/72 mmHg  Height: 163 cm  Weight: 66 kg  BSA: 1.7 m2  ------------------------------------------------------------------------  PROCEDURE: Transthoracic echocardiogram with 2-D, M-Mode  and complete spectral and color flow Doppler. Patient was  injected with 10 cc's of aerosolized saline.  Patient was injected with 10 cc's of aerosolized saline.  INDICATION: Cerebral infarction, unspecified (I63.9)  ------------------------------------------------------------------------  DIMENSIONS:  Dimensions:     Normal Values:  LA:     2.9 cm    2.0 - 4.0 cm  Ao:     3.3 cm    2.0 - 3.8 cm  SEPTUM: 0.8 cm    0.6 - 1.2 cm  PWT:    0.9 cm    0.6 - 1.1 cm  LVIDd:  4.3 cm    3.0 - 5.6 cm  LVIDs:  2.8 cm    1.8 - 4.0 cm  Derived Variables:  LVMI: 67 g/m2  RWT: 0.41  Fractional short: 35 %  Ejection Fraction (Teicholtz): 64 %  ------------------------------------------------------------------------  OBSERVATIONS:  Mitral Valve: Mitral annular calcification, otherwise  normal mitral valve. Minimal mitral regurgitation.  Aortic Root: Normal aortic root.  Aortic Valve: Calcified trileaflet aortic valve with normal  opening. Mild aortic regurgitation.  Left Atrium: Normal left atrium.  Left Ventricle: Normal left ventricular systolic function.  No segmental wall motion abnormalities. Normal left  ventricular internal dimensions and wall thicknesses. Mild  diastolic dysfunction (Stage I).  Right Heart: Normal right atrium. Normal right ventricular  size and function. Normal tricuspid valve.  Minimal  tricuspid regurgitation. Normal pulmonic valve.  Pericardium/PleuraNormal pericardium with no pericardial  effusion.  ------------------------------------------------------------------------  CONCLUSIONS:  1. Mitral annular calcification, otherwise normal mitral  valve. Minimal mitral regurgitation.  2. Calcified trileaflet aortic valve with normal opening.  Mild aortic regurgitation.  3. Normal left ventricular internal dimensions and wall  thicknesses.  4. Normal left ventricular systolic function. No segmental  wall motion abnormalities.  5. Mild diastolic dysfunction (Stage I).  6. Normal right ventricular size and function.  7. A bubble study was performed with the intravenous  injection of agitated saline contrast.  Following contrast  injection, bubbles were seen in the left heart.  This may  reflect the presence of an intracardiac shunt.  No obvious cardiac source of embolus was identified on this  transthoracic study.  If clinical suspicion is high,  consider MANDEEP for further evaluation.  ------------------------------------------------------------------------  Confirmed on  7/22/2021 - 08:20:07 by Henry Cantrell M.D.,  [facility], [place]  ------------------------------------------------------------------------

## 2021-07-22 NOTE — PROGRESS NOTE ADULT - SUBJECTIVE AND OBJECTIVE BOX
DATE OF SERVICE: 07/22/2021   Patient was seen and examined on 07/22/2021    .Interim events noted.Consultant notes ,Labs,Telemetry reviewed by me    PRESENTING CC:Weakness    HPI and HOSPITAL COURSE: 81F with PMH of RLS, gallstone pancreatitis brought in by family w/ slurred speech and right sided weakness.   On day of admission , pt felt dizzy. No room spinning. Dizziness seemed ot have resolved as the day progressed. Later in the morning, the patient noticed right sided weakness causing her to have difficulty ambulating around the house and use of her upper extremities. Sx were persistent and did not improve. She was then brought to the hospital for further evaluation. Otherwise, no recent illnesses, sick contacts, F/C, N/V, CP, palps, abd pain, diarrhea, dysuria.     ED course: 177/89, 72, 20, 98.4F, 100% RA. Stroke code was called''Resolved symptom of R sided weakness. CTH negative. Not tPA candidate due to outside window.''           INTERIM EVENTS:Awake alert speech fluent no arrhythmias noted      PMH -reviewed admission note, no change since admission  Heart Failure: Acute [ ] Chronic [ ] Acute on Chronic [ ] Diastolic [ ] Systolic [ ] Combined Systolic and Diastolic[ ]  LILIAN[ ]  ATN[ ]  CKD I [ ] CKDII [ ] CKD III [ ] CKD IV [ ] CKD V [ ] ESRD[ ]  HTN[ ] CVA[ ] DM[ ] COPD[ ] COVID[ ] AF[ ]  PPM[ ] ICD[ ]    MEDICATIONS  (STANDING):  aspirin enteric coated 81 milliGRAM(s) Oral daily  atorvastatin 40 milliGRAM(s) Oral at bedtime  cholecalciferol 2000 Unit(s) Oral daily  clopidogrel Tablet 75 milliGRAM(s) Oral daily  enoxaparin Injectable 40 milliGRAM(s) SubCutaneous daily              REVIEW OF SYSTEMS:  Constitutional: [ ] fever, [ ]weight loss,  [x ]fatigue  Eyes: [ ] visual changes  Respiratory: [ ]shortness of breath;  [ ] cough, [ ]wheezing, [ ]chills, [ ]hemoptysis  Cardiovascular: [ ] chest pain, [ ]palpitations, [ ]dizziness,  [ ]leg swelling[ ]orthopnea[ ]PND  Gastrointestinal: [ ] abdominal pain, [ ]nausea, [ ]vomiting,  [ ]diarrhea [ ]Constipation [ ]Melena  Genitourinary: [ ] dysuria, [ ] hematuria [ ][place]  Neurologic: [ ] headaches [ ] tremors[ ]weakness [ ]Paralysis Right[ ] Left[ ]  Skin: [ ] itching, [ ]burning, [ ] rashes  Endocrine: [ ] heat or cold intolerance  Musculoskeletal: [ ] joint pain or swelling; [ ] muscle, back, or extremity pain  Psychiatric: [ ] depression, [ ]anxiety, [ ]mood swings, or [ ]difficulty sleeping  Hematologic: [ ] easy bruising, [ ] bleeding gums    [x] All remaining systems negative except as per above.   [ ]Unable to obtain.    Vital Signs Last 24 Hrs  T(C): 36.8 (22 Jul 2021 06:48), Max: 36.8 (21 Jul 2021 12:14)  T(F): 98.3 (22 Jul 2021 06:48), Max: 98.3 (22 Jul 2021 06:48)  HR: 72 (22 Jul 2021 06:48) (70 - 72)  BP: 142/73 (22 Jul 2021 06:48) (142/73 - 155/72)  RR: 18 (22 Jul 2021 06:48) (17 - 18)  SpO2: 98% (22 Jul 2021 06:48) (97% - 98%)  I&O's Summary      PHYSICAL EXAM:  General: No acute distress BMI-24  HEENT: EOMI, PERRL  Neck: Supple, [ ] JVD  Lungs: Equal air entry bilaterally; [ ] rales [ ] wheezing [ ] rhonchi  Heart: Regular rate and rhythm; [x] murmur   2/6 [x ] systolic [ ] diastolic [ ] radiation[ ] rubs [ ]  gallops  Abdomen: Nontender, bowel sounds present  Extremities: No clubbing, cyanosis, [ ] edema [ ]Pulses  equal and intact  Nervous system:  Alert & Oriented X3, no focal deficits  Psychiatric: Normal affect  Skin: No rashes or lesions    LABS:  07-21    140  |  102  |  12  ----------------------------<  104<H>  3.5   |  24  |  0.87    Ca    9.1      21 Jul 2021 06:29    TPro  7.0  /  Alb  4.1  /  TBili  0.4  /  DBili  x   /  AST  17  /  ALT  17  /  AlkPhos  89  07-20    Creatinine Trend: 0.87<--, 0.83<--                        14.8   7.23  )-----------( 215      ( 20 Jul 2021 14:44 )             43.4     PT/INR - ( 20 Jul 2021 14:44 )   PT: 11.6 sec;   INR: 1.02 ratio         PTT - ( 20 Jul 2021 14:44 )  PTT:28.2 sec            RADIOLOGY:   CT ANGIO BRAIN NECK  IMPRESSION:   CT angiogram of the neck demonstrates no significant stenosis  CTA of the [place] demonstrates no significant stenosis or aneurysms.  CTH:No evidence acute hemorrhage mass or mass effect.      TELEMETRY:Reviewed monitor tracings-Sinus rhythm    ECHO:Pending    IMPRESSION AND PLAN:    81F with PMH of gallstone pancreatitis s/p cholecystectomy, RLS here w/ slurred speech and right sided weakness. Concern for CVA. Admitted for stroke workup.     Problem/Plan - 1:  ·  Problem: R/O Stroke.  Plan: -Stable. Sx not progressive. Still has slight dysarthria and right sided weakness.  -Appreciate neuro recs.   -ASA 81/Plavix 300 load x1 NOW. Then start ASA 81 daily + Plavix 75 daily.  -SLP/PT/OT eval.  -ECHO w/ bubble, MRI, CTA H/N as per neuro   -Will monitor FS for now w/ low dose ISS - no known hx of DM.  -Lovenox for DVT ppx.     Problem/Plan - 2:  ·  Problem: Hyponatremia.  Plan: Monitor for now. Encourage PO intake once cleared to take by mouth.   Resolved

## 2021-07-22 NOTE — PROGRESS NOTE ADULT - PROBLEM SELECTOR PLAN 1
-Stable. Sx not progressive. Still has slight dysarthria and right sided weakness.  -Appreciate neuro recs. Note reviewed.  -aspirin, echo   cards/ neuro
-Stable. Sx not progressive. Still has slight dysarthria and right sided weakness.  -Appreciate neuro recs. Note reviewed.  -aspirin, echo   cards/ neuro

## 2021-07-22 NOTE — PROGRESS NOTE ADULT - SUBJECTIVE AND OBJECTIVE BOX
SUBJECTIVE / OVERNIGHT EVENTS: pt seen and examined     MEDICATIONS  (STANDING):  aspirin enteric coated 81 milliGRAM(s) Oral daily  atorvastatin 40 milliGRAM(s) Oral at bedtime  cholecalciferol 2000 Unit(s) Oral daily  clopidogrel Tablet 75 milliGRAM(s) Oral daily  enoxaparin Injectable 40 milliGRAM(s) SubCutaneous daily    MEDICATIONS  (PRN):    Vital Signs Last 24 Hrs  T(C): 36.7 (22 Jul 2021 21:20), Max: 36.8 (22 Jul 2021 06:48)  T(F): 98.1 (22 Jul 2021 21:20), Max: 98.3 (22 Jul 2021 06:48)  HR: 73 (22 Jul 2021 21:20) (72 - 73)  BP: 138/80 (22 Jul 2021 21:20) (117/52 - 142/73)  BP(mean): --  RR: 18 (22 Jul 2021 21:20) (18 - 18)  SpO2: 98% (22 Jul 2021 21:20) (98% - 99%)    Constitutional: No fever, fatigue  Skin: No rash.  Eyes: No recent vision problems or eye pain.  ENT: No congestion, ear pain, or sore throat.  Cardiovascular: No chest pain or palpation.  Respiratory: No cough, shortness of breath, congestion, or wheezing.  Gastrointestinal: No abdominal pain, nausea, vomiting, or diarrhea.  Genitourinary: No dysuria.  Musculoskeletal: No joint swelling.  Neurologic: No headache.    PHYSICAL EXAM:  GENERAL: NAD  EYES: EOMI, PERRLA  NECK: Supple, No JVD  CHEST/LUNG: dec breath sounds at bases  HEART:  S1 , S2 +  ABDOMEN: soft , bs+  EXTREMITIES:  trace edema  NEUROLOGY:alert awake    LABS:  07-22    140  |  106  |  16  ----------------------------<  93  3.7   |  21<L>  |  0.95    Ca    9.1      22 Jul 2021 08:06    TPro  6.5  /  Alb  3.7  /  TBili  0.6  /  DBili      /  AST  19  /  ALT  16  /  AlkPhos  79  07-22    Creatinine Trend: 0.95 <--, 0.87 <--, 0.83 <--                        14.8   6.76  )-----------( 220      ( 22 Jul 2021 08:06 )             44.4     Urine Studies:            LIVER FUNCTIONS - ( 22 Jul 2021 08:06 )  Alb: 3.7 g/dL / Pro: 6.5 g/dL / ALK PHOS: 79 U/L / ALT: 16 U/L / AST: 19 U/L / GGT: x               Care Discussed with Consultants/Other Providers:

## 2021-07-23 ENCOUNTER — TRANSCRIPTION ENCOUNTER (OUTPATIENT)
Age: 81
End: 2021-07-23

## 2021-07-23 LAB
ANION GAP SERPL CALC-SCNC: 13 MMOL/L — SIGNIFICANT CHANGE UP (ref 7–14)
BUN SERPL-MCNC: 22 MG/DL — SIGNIFICANT CHANGE UP (ref 7–23)
CALCIUM SERPL-MCNC: 8.5 MG/DL — SIGNIFICANT CHANGE UP (ref 8.4–10.5)
CHLORIDE SERPL-SCNC: 104 MMOL/L — SIGNIFICANT CHANGE UP (ref 98–107)
CO2 SERPL-SCNC: 21 MMOL/L — LOW (ref 22–31)
CREAT SERPL-MCNC: 0.98 MG/DL — SIGNIFICANT CHANGE UP (ref 0.5–1.3)
GLUCOSE SERPL-MCNC: 95 MG/DL — SIGNIFICANT CHANGE UP (ref 70–99)
HCT VFR BLD CALC: 43.8 % — SIGNIFICANT CHANGE UP (ref 34.5–45)
HGB BLD-MCNC: 14.5 G/DL — SIGNIFICANT CHANGE UP (ref 11.5–15.5)
MAGNESIUM SERPL-MCNC: 2.1 MG/DL — SIGNIFICANT CHANGE UP (ref 1.6–2.6)
MCHC RBC-ENTMCNC: 28.7 PG — SIGNIFICANT CHANGE UP (ref 27–34)
MCHC RBC-ENTMCNC: 33.1 GM/DL — SIGNIFICANT CHANGE UP (ref 32–36)
MCV RBC AUTO: 86.6 FL — SIGNIFICANT CHANGE UP (ref 80–100)
NRBC # BLD: 0 /100 WBCS — SIGNIFICANT CHANGE UP
NRBC # FLD: 0 K/UL — SIGNIFICANT CHANGE UP
PHOSPHATE SERPL-MCNC: 3.8 MG/DL — SIGNIFICANT CHANGE UP (ref 2.5–4.5)
PLATELET # BLD AUTO: 206 K/UL — SIGNIFICANT CHANGE UP (ref 150–400)
POTASSIUM SERPL-MCNC: 3.8 MMOL/L — SIGNIFICANT CHANGE UP (ref 3.5–5.3)
POTASSIUM SERPL-SCNC: 3.8 MMOL/L — SIGNIFICANT CHANGE UP (ref 3.5–5.3)
RBC # BLD: 5.06 M/UL — SIGNIFICANT CHANGE UP (ref 3.8–5.2)
RBC # FLD: 12.4 % — SIGNIFICANT CHANGE UP (ref 10.3–14.5)
SODIUM SERPL-SCNC: 138 MMOL/L — SIGNIFICANT CHANGE UP (ref 135–145)
WBC # BLD: 8.34 K/UL — SIGNIFICANT CHANGE UP (ref 3.8–10.5)
WBC # FLD AUTO: 8.34 K/UL — SIGNIFICANT CHANGE UP (ref 3.8–10.5)

## 2021-07-23 RX ORDER — ATORVASTATIN CALCIUM 80 MG/1
1 TABLET, FILM COATED ORAL
Qty: 30 | Refills: 0
Start: 2021-07-23 | End: 2021-08-21

## 2021-07-23 RX ORDER — CHOLECALCIFEROL (VITAMIN D3) 125 MCG
1 CAPSULE ORAL
Qty: 0 | Refills: 0 | DISCHARGE

## 2021-07-23 RX ORDER — CLOPIDOGREL BISULFATE 75 MG/1
1 TABLET, FILM COATED ORAL
Qty: 17 | Refills: 0
Start: 2021-07-23 | End: 2021-08-08

## 2021-07-23 RX ORDER — CHOLECALCIFEROL (VITAMIN D3) 125 MCG
2000 CAPSULE ORAL
Qty: 0 | Refills: 0 | DISCHARGE
Start: 2021-07-23

## 2021-07-23 RX ORDER — ASPIRIN/CALCIUM CARB/MAGNESIUM 324 MG
1 TABLET ORAL
Qty: 30 | Refills: 3
Start: 2021-07-23 | End: 2021-11-19

## 2021-07-23 RX ADMIN — ENOXAPARIN SODIUM 40 MILLIGRAM(S): 100 INJECTION SUBCUTANEOUS at 14:20

## 2021-07-23 RX ADMIN — CLOPIDOGREL BISULFATE 75 MILLIGRAM(S): 75 TABLET, FILM COATED ORAL at 14:20

## 2021-07-23 RX ADMIN — Medication 2000 UNIT(S): at 14:20

## 2021-07-23 RX ADMIN — Medication 81 MILLIGRAM(S): at 14:20

## 2021-07-23 RX ADMIN — ATORVASTATIN CALCIUM 40 MILLIGRAM(S): 80 TABLET, FILM COATED ORAL at 21:43

## 2021-07-23 NOTE — DISCHARGE NOTE PROVIDER - CARE PROVIDER_API CALL
Reyes Fitzpatrick)  Neurology; Vascular Neurology  [address] [facility] [address]  [place] [address]  Phone: [phone]  Fax: [phone]  Follow Up Time: 1 week

## 2021-07-23 NOTE — PROGRESS NOTE ADULT - SUBJECTIVE AND OBJECTIVE BOX
PATIENT SEEN AND EXAMINED ON :-07/23/2021  DATE OF SERVICE:     07/23/2021    Interim events noted,Labs ,Radiological studies and Cardiology tests reviewed .    INTERIM  Patient seen and examined. family at bedside. MRI confirms infarct. patient doing okay     Medication:  MEDICATIONS  (STANDING):  aspirin enteric coated 81 milliGRAM(s) Oral daily  atorvastatin 40 milliGRAM(s) Oral at bedtime  cholecalciferol 2000 Unit(s) Oral daily  clopidogrel Tablet 75 milliGRAM(s) Oral daily  enoxaparin Injectable 40 milliGRAM(s) SubCutaneous daily    MEDICATIONS  (PRN):        Vitals:  Vital Signs Last 24 Hrs  T(C): 36.6 (23 Jul 2021 06:40), Max: 36.8 (22 Jul 2021 12:45)  T(F): 97.8 (23 Jul 2021 06:40), Max: 98.3 (22 Jul 2021 12:45)  HR: 74 (23 Jul 2021 06:40) (72 - 74)  BP: 126/63 (23 Jul 2021 06:40) (117/52 - 138/80)  BP(mean): --  RR: 18 (23 Jul 2021 06:40) (18 - 18)  SpO2: 98% (23 Jul 2021 06:40) (98% - 99%)    General Exam:   General Appearance: Appropriately dressed and in no acute distress       Head: Normocephalic, atraumatic and no dysmorphic features  Ear, Nose, and Throat: Moist mucous membranes  CVS: S1S2+  Resp: No SOB, no wheeze or rhonchi  Abd: soft NTND  Extremities: No edema, no cyanosis  Skin: No bruises, no rashes     Neurological Exam:  Mental Status: Awake, alert and oriented x 2.  Able to follow simple and complex verbal commands. Able to name and repeat. fluent speech. No obvious aphasia  + dysarthria noted.   Cranial Nerves: PERRL, EOMI, VFFC, sensation V1-V3 intact,  R facial asymmetry , equal elevation of palate, scm/trap 5/5, tongue is midline on protrusion. no obvious papilledema on fundoscopic exam. Hearing is grossly intact.   Motor: Normal bulk, tone and strength throughout. L. RUE 3/5, RLE 4/5    Sensation: Intact to light touch and pinprick throughout. no right/left confusion. no extinction to tactile on DSS.    Reflexes: 1+ throughout at biceps, brachioradialis, triceps, patellars and ankles bilaterally and equal. No clonus. R toe and L toe were both downgoing.  Coordination: unable   Gait: unalbe 07-23    138  |  104  |  22  ----------------------------<  95  3.8   |  21<L>  |  0.98    Ca    8.5      23 Jul 2021 06:14  Phos  3.8     07-23  Mg     2.10     07-23    TPro  6.5  /  Alb  3.7  /  TBili  0.6  /  DBili  x   /  AST  19  /  ALT  16  /  AlkPhos  79  07-22    MRI BRAIN:  IMPRESSION: Acute infarct involving the left nickolas.      ECHO:  CONCLUSIONS:  1. Mitral annular calcification, otherwise normal mitral valve. Minimal mitral regurgitation.  2. Calcified trileaflet aortic valve with normal opening.  Mild aortic regurgitation.  3. Normal left ventricular internal dimensions and wall thicknesses.  4. Normal left ventricular systolic function. No segmental wall motion abnormalities.  5. Mild diastolic dysfunction (Stage I).  6. Normal right ventricular size and function.  7. A bubble study was performed with the intravenous injection of agitated saline contrast.  Following contrast injection, bubbles were seen in the left heart.  This may reflect the presence of an intracardiac shunt.  No obvious cardiac source of embolus was identified on this  transthoracic study.  If clinical suspicion is high, consider MANDEEP for further evaluation.        IMPRESSION AND PLAN:    81F with PMH of gallstone pancreatitis s/p cholecystectomy, RLS here w/ slurred speech and right sided weakness. Concern for CVA. Admitted for stroke workup.     Problem/Plan - 1:  ·  Problem:  Stroke.  Plan: -Stable. Sx not progressive. Still has slight dysarthria and right sided weakness.  -Appreciate neuro recs.   -ASA 81/Plavix 300 load x1 NOW. Then start ASA 81 daily + Plavix 75 daily.  -SLP/PT/OT eval.  -ECHO w/ bubble,   MRI confirms CVA nickolas  -Will monitor FS for now w/ low dose ISS - no known hx of DM.  -Lovenox for DVT ppx.     Problem/Plan - 2:  ·  Problem: Hyponatremia.  Plan: Monitor for now. Encourage PO intake once cleared to take by mouth.   Resolved

## 2021-07-23 NOTE — DISCHARGE NOTE PROVIDER - NSDCFUADDAPPT_GEN_ALL_CORE_FT
If you are in need of a general medicine physician and post-discharge medical follow-up for further care/recommendations you may contact the [facility] for an appointment [phone]

## 2021-07-23 NOTE — DISCHARGE NOTE PROVIDER - INSTRUCTIONS
low salt low cholesterol mechanical soft diet  Nectar consistency fluid low cholesterol mechanical soft diet  Nectar consistency fluid

## 2021-07-23 NOTE — PROGRESS NOTE ADULT - SUBJECTIVE AND OBJECTIVE BOX
Neurology Progress Note    S: Patient seen and examined. family at bedside. MRI confirms infarct. patient doing okay     Medication:  MEDICATIONS  (STANDING):  aspirin enteric coated 81 milliGRAM(s) Oral daily  atorvastatin 40 milliGRAM(s) Oral at bedtime  cholecalciferol 2000 Unit(s) Oral daily  clopidogrel Tablet 75 milliGRAM(s) Oral daily  enoxaparin Injectable 40 milliGRAM(s) SubCutaneous daily    MEDICATIONS  (PRN):        Vitals:  Vital Signs Last 24 Hrs  T(C): 36.6 (23 Jul 2021 06:40), Max: 36.8 (22 Jul 2021 12:45)  T(F): 97.8 (23 Jul 2021 06:40), Max: 98.3 (22 Jul 2021 12:45)  HR: 74 (23 Jul 2021 06:40) (72 - 74)  BP: 126/63 (23 Jul 2021 06:40) (117/52 - 138/80)  BP(mean): --  RR: 18 (23 Jul 2021 06:40) (18 - 18)  SpO2: 98% (23 Jul 2021 06:40) (98% - 99%)    General Exam:   General Appearance: Appropriately dressed and in no acute distress       Head: Normocephalic, atraumatic and no dysmorphic features  Ear, Nose, and Throat: Moist mucous membranes  CVS: S1S2+  Resp: No SOB, no wheeze or rhonchi  Abd: soft NTND  Extremities: No edema, no cyanosis  Skin: No bruises, no rashes     Neurological Exam:  Mental Status: Awake, alert and oriented x 2.  Able to follow simple and complex verbal commands. Able to name and repeat. fluent speech. No obvious aphasia  + dysarthria noted.   Cranial Nerves: PERRL, EOMI, VFFC, sensation V1-V3 intact,  R facial asymmetry , equal elevation of palate, scm/trap 5/5, tongue is midline on protrusion. no obvious papilledema on fundoscopic exam. Hearing is grossly intact.   Motor: Normal bulk, tone and strength throughout. L. RUE 3/5, RLE 4/5    Sensation: Intact to light touch and pinprick throughout. no right/left confusion. no extinction to tactile on DSS.    Reflexes: 1+ throughout at biceps, brachioradialis, triceps, patellars and ankles bilaterally and equal. No clonus. R toe and L toe were both downgoing.  Coordination: unable   Gait: unalbe     I personally reviewed the below data/images/labs:      CBC Full  -  ( 23 Jul 2021 06:14 )  WBC Count : 8.34 K/uL  RBC Count : 5.06 M/uL  Hemoglobin : 14.5 g/dL  Hematocrit : 43.8 %  Platelet Count - Automated : 206 K/uL  Mean Cell Volume : 86.6 fL  Mean Cell Hemoglobin : 28.7 pg  Mean Cell Hemoglobin Concentration : 33.1 gm/dL  Auto Neutrophil # : x  Auto Lymphocyte # : x  Auto Monocyte # : x  Auto Eosinophil # : x  Auto Basophil # : x  Auto Neutrophil % : x  Auto Lymphocyte % : x  Auto Monocyte % : x  Auto Eosinophil % : x  Auto Basophil % : x      07-23    138  |  104  |  22  ----------------------------<  95  3.8   |  21<L>  |  0.98    Ca    8.5      23 Jul 2021 06:14  Phos  3.8     07-23  Mg     2.10     07-23    TPro  6.5  /  Alb  3.7  /  TBili  0.6  /  DBili  x   /  AST  19  /  ALT  16  /  AlkPhos  79  07-22      c< from: CT Brain Stroke Protocol (07.20.21 @ 14:36) >  INTERPRETATION:  Clinical indications: Stroke code.    Multiple axial sections were performed from base of skull to vertex without contrast enhancement. Coronal and sagittal constructions were performed as well    Parenchymal volume loss and chronic microvascular ischemic changes are identified    Old lacunar infarct versus prominent VR space is seen involving the left basal ganglia region    There is no evidence acute hemorrhage mass mass effect seen.    Evaluation of the osseous structures with the appropriate window appears normal    The visualized paranasal sinuses mastoid and middle ear regions appear clear.    IMPRESSION: No evidence acute hemorrhage mass or mass effect.    Findings discussed with neurology resident on July 20, 2021 at 2:38 PM  with read back.    --- End of Report ---      < end of copied text >      < from: MR Head No Cont (07.22.21 @ 02:01) >    EXAM:  MR BRAIN        PROCEDURE DATE:  Jul 22 2021         INTERPRETATION:  Clinical indication: Stroke code.    MRI of the brain was performed using sagittal T1, axial T1 T2 T2 FLAIR diffusion and susceptibility weighted sequence.    Parenchymal volume loss and chronic microvascular ischemic changes are identified    There is abnormal T2 prolongation with restricted diffusion seen involving left nickolas. This compatible with an acute infarct. No hemorrhagic transformation is seen. No significantshift or herniation is seen    The large vessels demonstrate normal flow voids    The visualized paranasal sinuses mastoid and middle ear regions appear clear.    IMPRESSION: Acute infarct involving the left nickolas.    --- End of Report ---              JOSE ARMANDO GALAVIZ MD; Attending Radiologist  This document has been electronically signed. Jul 22 2021  9:24AM    < end of copied text >\  \  Patient name: ABDELRAHMAN CASTELLANOS  YOB: 1940   Age: 81 (F)   MR#: [id number]  Study Date: 7/21/2021  [facility]  Study quality: Technically good  Referring Physician: Refugio Russo MD  Blood Pressure: 155/72 mmHg  Height: 163 cm  Weight: 66 kg  BSA: 1.7 m2  ------------------------------------------------------------------------  PROCEDURE: Transthoracic echocardiogram with 2-D, M-Mode  and complete spectral and color flow Doppler. Patient was  injected with 10 cc's of aerosolized saline.  Patient was injected with 10 cc's of aerosolized saline.  INDICATION: Cerebral infarction, unspecified (I63.9)  ------------------------------------------------------------------------  DIMENSIONS:  Dimensions:     Normal Values:  LA:     2.9 cm    2.0 - 4.0 cm  Ao:     3.3 cm    2.0 - 3.8 cm  SEPTUM: 0.8 cm    0.6 - 1.2 cm  PWT:    0.9 cm    0.6 - 1.1 cm  LVIDd:  4.3 cm    3.0 - 5.6 cm  LVIDs:  2.8 cm    1.8 - 4.0 cm  Derived Variables:  LVMI: 67 g/m2  RWT: 0.41  Fractional short: 35 %  Ejection Fraction (Teicholtz): 64 %  ------------------------------------------------------------------------  OBSERVATIONS:  Mitral Valve: Mitral annular calcification, otherwise  normal mitral valve. Minimal mitral regurgitation.  Aortic Root: Normal aortic root.  Aortic Valve: Calcified trileaflet aortic valve with normal  opening. Mild aortic regurgitation.  Left Atrium: Normal left atrium.  Left Ventricle: Normal left ventricular systolic function.  No segmental wall motion abnormalities. Normal left  ventricular internal dimensions and wall thicknesses. Mild  diastolic dysfunction (Stage I).  Right Heart: Normal right atrium. Normal right ventricular  size and function. Normal tricuspid valve.  Minimal  tricuspid regurgitation. Normal pulmonic valve.  Pericardium/PleuraNormal pericardium with no pericardial  effusion.  ------------------------------------------------------------------------  CONCLUSIONS:  1. Mitral annular calcification, otherwise normal mitral  valve. Minimal mitral regurgitation.  2. Calcified trileaflet aortic valve with normal opening.  Mild aortic regurgitation.  3. Normal left ventricular internal dimensions and wall  thicknesses.  4. Normal left ventricular systolic function. No segmental  wall motion abnormalities.  5. Mild diastolic dysfunction (Stage I).  6. Normal right ventricular size and function.  7. A bubble study was performed with the intravenous  injection of agitated saline contrast.  Following contrast  injection, bubbles were seen in the left heart.  This may  reflect the presence of an intracardiac shunt.  No obvious cardiac source of embolus was identified on this  transthoracic study.  If clinical suspicion is high,  consider MANDEEP for further evaluation.  ------------------------------------------------------------------------  Confirmed on  7/22/2021 - 08:20:07 by Henry Cantrell M.D.,  [facility], [place]  ------------------------------------------------------------------------

## 2021-07-23 NOTE — DISCHARGE NOTE PROVIDER - HOSPITAL COURSE
80 yo  F with restless leg syndrome and remote hx of gallstone pancreatitis in 2017 presented to the ED due to new onset R sided weakness and dysarthria. symptoms seem to have been fluctuating.  on my exam mod R facial, RUE 3/5 RLE 4+/5 and dysarthria with a NIHSS of ~6-7. patient takes no AT or statin at home   CTH neg, CTA h/N unremarkable and no LVO       L pontine infarct - Likely small vessel stroke.    - Dual antiplatelet therapy with ASA 81mg PO daily and Clopidogrel 75mg PO daily x 3 weeks followed by monotherapy with ASA 81mg PO daily.  - atorvastatin 40mg PO daily. LDL goal <70mg/dL., - telemetry monitoring during admission   - PT/OT/SS/SLP, OOBC, - check FS, glucose control <180  - permissive HTN, -180mmHg x 48 hours then normotension   - Pt recommends Rehab - family wants to take patient home      Hyponatremia.  Plan: Monitor for now. Encourage PO intake once cleared to take by mouth.       Patient medically cleared for discharge home in am 7/24 with home care services   80 yo  F with restless leg syndrome and remote hx of gallstone pancreatitis in 2017 presented to the ED due to new onset R sided weakness and dysarthria.     L pontine infarct - Likely small vessel stroke.      symptoms seem to have been fluctuating.    on exam mod R facial, RUE 3/5 RLE 4+/5 and dysarthria with a NIHSS of ~6-7.   patient takes no AT or statin at home   CTH neg, CTA h/N unremarkable and no LVO     - Dual antiplatelet therapy with ASA 81mg PO daily and Clopidogrel 75mg PO daily x 3 weeks followed by monotherapy with ASA 81mg PO daily.  - atorvastatin 40mg PO daily. LDL goal <70mg/dL.  - permissive HTN, -180mmHg x 48 hours then normotension   - Pt recommends Rehab - family wants to take patient home      Hyponatremia.    Encourage PO intake     On 7/24/21 this case was reviewed with , the patient is medically stable and optimized for discharge. All medications were reviewed and prescriptions were sent to mutually agreed upon pharmacy. The patient agrees to follow up with providers as recommended.

## 2021-07-23 NOTE — DISCHARGE NOTE PROVIDER - NSDCMRMEDTOKEN_GEN_ALL_CORE_FT
aspirin 81 mg oral delayed release tablet: 1 tab(s) orally once a day  atorvastatin 40 mg oral tablet: 1 tab(s) orally once a day (at bedtime)  cholecalciferol oral tablet: 2000 unit(s) orally once a day  clopidogrel 75 mg oral tablet: 1 tab(s) orally once a day  Hair, Skin &amp; Nails 5 mg oral capsule: 1 cap(s) orally 3 times a week   aspirin 81 mg oral delayed release tablet: 1 tab(s) orally once a day  atorvastatin 40 mg oral tablet: 1 tab(s) orally once a day (at bedtime)  cholecalciferol oral tablet: 2000 unit(s) orally once a day  clopidogrel 75 mg oral tablet: 1 tab(s) orally once a day  Commode:   Hair, Skin &amp; Nails 5 mg oral capsule: 1 cap(s) orally 3 times a week

## 2021-07-23 NOTE — DISCHARGE NOTE PROVIDER - NSDCCPCAREPLAN_GEN_ALL_CORE_FT
PRINCIPAL DISCHARGE DIAGNOSIS  Diagnosis: CVA (cerebrovascular accident)  Assessment and Plan of Treatment: Continue physical therapy and skills learned for rehabilitation.  Follow up with your neurologist in 1-2 weeks for further medical management.  Continue to take your medications as prescribed, low salt, low fat, and diabetic diet.  Consider joining a support group for stroke survivors for emotional support to prevent depression.   - continue all medication          PRINCIPAL DISCHARGE DIAGNOSIS  Diagnosis: CVA (cerebrovascular accident)  Assessment and Plan of Treatment: Continue physical therapy and skills learned for home services.  Follow up with your neurologist in 1-2 weeks for further medical management.  Continue to take your medications as prescribed, low salt, low fat, and diabetic diet.  Consider joining a support group for stroke survivors for emotional support to prevent depression.   It is very important for you to  continue to take Clopidogrel 75mg orally daily for a total of 3 weeks.  Please take take Aspirin 81mg orally daily and Atorvastatin 40mg orally daily.        SECONDARY DISCHARGE DIAGNOSES  Diagnosis: Hyponatremia  Assessment and Plan of Treatment: Your sodium level upon admission was low which has since normalized. Please continue to eat a well balanced diet.

## 2021-07-24 ENCOUNTER — TRANSCRIPTION ENCOUNTER (OUTPATIENT)
Age: 81
End: 2021-07-24

## 2021-07-24 VITALS
TEMPERATURE: 98 F | DIASTOLIC BLOOD PRESSURE: 63 MMHG | HEART RATE: 65 BPM | RESPIRATION RATE: 18 BRPM | SYSTOLIC BLOOD PRESSURE: 126 MMHG | OXYGEN SATURATION: 97 %

## 2021-07-24 RX ADMIN — CLOPIDOGREL BISULFATE 75 MILLIGRAM(S): 75 TABLET, FILM COATED ORAL at 12:22

## 2021-07-24 RX ADMIN — ENOXAPARIN SODIUM 40 MILLIGRAM(S): 100 INJECTION SUBCUTANEOUS at 12:22

## 2021-07-24 RX ADMIN — Medication 2000 UNIT(S): at 12:22

## 2021-07-24 RX ADMIN — Medication 81 MILLIGRAM(S): at 12:22

## 2021-07-24 NOTE — PROGRESS NOTE ADULT - SUBJECTIVE AND OBJECTIVE BOX
Neurology Progress Note    S: Patient seen and examined. family at bedside. MRI confirms infarct. patient doing okay d/c plan home today, refused rehab     Medication:  MEDICATIONS  (STANDING):  aspirin enteric coated 81 milliGRAM(s) Oral daily  atorvastatin 40 milliGRAM(s) Oral at bedtime  cholecalciferol 2000 Unit(s) Oral daily  clopidogrel Tablet 75 milliGRAM(s) Oral daily  enoxaparin Injectable 40 milliGRAM(s) SubCutaneous daily    MEDICATIONS  (PRN):      Vitals:  Vital Signs Last 24 Hrs  T(C): 36.8 (24 Jul 2021 05:30), Max: 36.8 (24 Jul 2021 05:30)  T(F): 98.2 (24 Jul 2021 05:30), Max: 98.2 (24 Jul 2021 05:30)  HR: 69 (24 Jul 2021 05:30) (69 - 72)  BP: 125/65 (24 Jul 2021 05:30) (120/66 - 125/65)  BP(mean): --  RR: 18 (24 Jul 2021 05:30) (18 - 18)  SpO2: 100% (24 Jul 2021 05:30) (100% - 100%)    General Exam:   General Appearance: Appropriately dressed and in no acute distress       Head: Normocephalic, atraumatic and no dysmorphic features  Ear, Nose, and Throat: Moist mucous membranes  CVS: S1S2+  Resp: No SOB, no wheeze or rhonchi  Abd: soft NTND  Extremities: No edema, no cyanosis  Skin: No bruises, no rashes     Neurological Exam:  Mental Status: Awake, alert and oriented x 2.  Able to follow simple and complex verbal commands. Able to name and repeat. fluent speech. No obvious aphasia  + dysarthria noted.   Cranial Nerves: PERRL, EOMI, VFFC, sensation V1-V3 intact,  R facial asymmetry , equal elevation of palate, scm/trap 5/5, tongue is midline on protrusion. no obvious papilledema on fundoscopic exam. Hearing is grossly intact.   Motor: Normal bulk, tone and strength throughout. L. RUE 3/5, RLE 4/5    Sensation: Intact to light touch and pinprick throughout. no right/left confusion. no extinction to tactile on DSS.    Reflexes: 1+ throughout at biceps, brachioradialis, triceps, patellars and ankles bilaterally and equal. No clonus. R toe and L toe were both downgoing.  Coordination: unable   Gait: devon     I personally reviewed the below data/images/labs:      CBC Full  -  ( 23 Jul 2021 06:14 )  WBC Count : 8.34 K/uL  RBC Count : 5.06 M/uL  Hemoglobin : 14.5 g/dL  Hematocrit : 43.8 %  Platelet Count - Automated : 206 K/uL  Mean Cell Volume : 86.6 fL  Mean Cell Hemoglobin : 28.7 pg  Mean Cell Hemoglobin Concentration : 33.1 gm/dL  Auto Neutrophil # : x  Auto Lymphocyte # : x  Auto Monocyte # : x  Auto Eosinophil # : x  Auto Basophil # : x  Auto Neutrophil % : x  Auto Lymphocyte % : x  Auto Monocyte % : x  Auto Eosinophil % : x  Auto Basophil % : x      07-23    138  |  104  |  22  ----------------------------<  95  3.8   |  21<L>  |  0.98    Ca    8.5      23 Jul 2021 06:14  Phos  3.8     07-23  Mg     2.10     07-23    TPro  6.5  /  Alb  3.7  /  TBili  0.6  /  DBili  x   /  AST  19  /  ALT  16  /  AlkPhos  79  07-22      c< from: CT Brain Stroke Protocol (07.20.21 @ 14:36) >  INTERPRETATION:  Clinical indications: Stroke code.    Multiple axial sections were performed from base of skull to vertex without contrast enhancement. Coronal and sagittal constructions were performed as well    Parenchymal volume loss and chronic microvascular ischemic changes are identified    Old lacunar infarct versus prominent VR space is seen involving the left basal ganglia region    There is no evidence acute hemorrhage mass mass effect seen.    Evaluation of the osseous structures with the appropriate window appears normal    The visualized paranasal sinuses mastoid and middle ear regions appear clear.    IMPRESSION: No evidence acute hemorrhage mass or mass effect.    Findings discussed with neurology resident on July 20, 2021 at 2:38 PM  with read back.    --- End of Report ---      < end of copied text >      < from: MR Head No Cont (07.22.21 @ 02:01) >    EXAM:  MR BRAIN        PROCEDURE DATE:  Jul 22 2021         INTERPRETATION:  Clinical indication: Stroke code.    MRI of the brain was performed using sagittal T1, axial T1 T2 T2 FLAIR diffusion and susceptibility weighted sequence.    Parenchymal volume loss and chronic microvascular ischemic changes are identified    There is abnormal T2 prolongation with restricted diffusion seen involving left nickolas. This compatible with an acute infarct. No hemorrhagic transformation is seen. No significantshift or herniation is seen    The large vessels demonstrate normal flow voids    The visualized paranasal sinuses mastoid and middle ear regions appear clear.    IMPRESSION: Acute infarct involving the left nickolas.    --- End of Report ---              JOSE ARMANDO GALAVIZ MD; Attending Radiologist  This document has been electronically signed. Jul 22 2021  9:24AM    < end of copied text >\  \  Patient name: ABDELRAHMAN CASTELLANOS  YOB: 1940   Age: 81 (F)   MR#: [id number]  Study Date: 7/21/2021  [facility]  Study quality: Technically good  Referring Physician: Refugio Russo MD  Blood Pressure: 155/72 mmHg  Height: 163 cm  Weight: 66 kg  BSA: 1.7 m2  ------------------------------------------------------------------------  PROCEDURE: Transthoracic echocardiogram with 2-D, M-Mode  and complete spectral and color flow Doppler. Patient was  injected with 10 cc's of aerosolized saline.  Patient was injected with 10 cc's of aerosolized saline.  INDICATION: Cerebral infarction, unspecified (I63.9)  ------------------------------------------------------------------------  DIMENSIONS:  Dimensions:     Normal Values:  LA:     2.9 cm    2.0 - 4.0 cm  Ao:     3.3 cm    2.0 - 3.8 cm  SEPTUM: 0.8 cm    0.6 - 1.2 cm  PWT:    0.9 cm    0.6 - 1.1 cm  LVIDd:  4.3 cm    3.0 - 5.6 cm  LVIDs:  2.8 cm    1.8 - 4.0 cm  Derived Variables:  LVMI: 67 g/m2  RWT: 0.41  Fractional short: 35 %  Ejection Fraction (Teicholtz): 64 %  ------------------------------------------------------------------------  OBSERVATIONS:  Mitral Valve: Mitral annular calcification, otherwise  normal mitral valve. Minimal mitral regurgitation.  Aortic Root: Normal aortic root.  Aortic Valve: Calcified trileaflet aortic valve with normal  opening. Mild aortic regurgitation.  Left Atrium: Normal left atrium.  Left Ventricle: Normal left ventricular systolic function.  No segmental wall motion abnormalities. Normal left  ventricular internal dimensions and wall thicknesses. Mild  diastolic dysfunction (Stage I).  Right Heart: Normal right atrium. Normal right ventricular  size and function. Normal tricuspid valve.  Minimal  tricuspid regurgitation. Normal pulmonic valve.  Pericardium/PleuraNormal pericardium with no pericardial  effusion.  ------------------------------------------------------------------------  CONCLUSIONS:  1. Mitral annular calcification, otherwise normal mitral  valve. Minimal mitral regurgitation.  2. Calcified trileaflet aortic valve with normal opening.  Mild aortic regurgitation.  3. Normal left ventricular internal dimensions and wall  thicknesses.  4. Normal left ventricular systolic function. No segmental  wall motion abnormalities.  5. Mild diastolic dysfunction (Stage I).  6. Normal right ventricular size and function.  7. A bubble study was performed with the intravenous  injection of agitated saline contrast.  Following contrast  injection, bubbles were seen in the left heart.  This may  reflect the presence of an intracardiac shunt.  No obvious cardiac source of embolus was identified on this  transthoracic study.  If clinical suspicion is high,  consider MANDEEP for further evaluation.  ------------------------------------------------------------------------  Confirmed on  7/22/2021 - 08:20:07 by Henry Cantrell M.D.,  [facility], [place]  ------------------------------------------------------------------------

## 2021-07-24 NOTE — PROGRESS NOTE ADULT - SUBJECTIVE AND OBJECTIVE BOX
DATE OF SERVICE:  07/24/2021  Patient was seen and examined on 07/24/2021    .Interim events noted.Consultant notes ,Labs,Telemetry reviewed by me    PRESENTING CC:Slurred speech    HPI and HOSPITAL COURSE: HPI:  81F with PMH of RLS, gallstone pancreatitis brought in by family w/ slurred speech and right sided weakness. Around 530AM, pt felt dizzy. No room spinning. Dizziness seemed ot have resolved as the day progressed. Later in the morning, the patient noticed right sided weakness causing her to have difficulty ambulating around the house and use of her upper extremities. Sx were persistent and did not improve. She was then brought to the hospital for further evaluation. Otherwise, no recent illnesses, sick contacts, F/C, N/V, CP, palps, abd pain, diarrhea, dysuria.     ED course: 177/89, 72, 20, 98.4F, 100% RA. Stroke code.       INTERIM EVENTS:Awake alert speech improved right weakness persists      PMH -reviewed admission note, no change since admission  Heart Failure: Acute [ ] Chronic [ ] Acute on Chronic [ ] Diastolic [ ] Systolic [ ] Combined Systolic and Diastolic[ ]  LILIAN[ ]  ATN[ ]  CKD I [ ] CKDII [ ] CKD III [ ] CKD IV [ ] CKD V [ ] ESRD[ ]  HTN[ ] CVA[ ] DM[ ] COPD[ ] COVID[ ] AF[ ]  PPM[ ] ICD[ ]    MEDICATIONS  (STANDING):  aspirin enteric coated 81 milliGRAM(s) Oral daily  atorvastatin 40 milliGRAM(s) Oral at bedtime  cholecalciferol 2000 Unit(s) Oral daily  clopidogrel Tablet 75 milliGRAM(s) Oral daily  enoxaparin Injectable 40 milliGRAM(s) SubCutaneous daily          REVIEW OF SYSTEMS:  Constitutional: [ ] fever, [ ]weight loss,  [ ]fatigue  Eyes: [ ] visual changes  Respiratory: [ ]shortness of breath;  [ ] cough, [ ]wheezing, [ ]chills, [ ]hemoptysis  Cardiovascular: [ ] chest pain, [ ]palpitations, [ ]dizziness,  [ ]leg swelling[ ]orthopnea[ ]PND  Gastrointestinal: [ ] abdominal pain, [ ]nausea, [ ]vomiting,  [ ]diarrhea [ ]Constipation [ ]Melena  Genitourinary: [ ] dysuria, [ ] hematuria [ ]Blanchard  Neurologic: [ ] headaches [ ] tremors[x ]weakness [ ]Paralysis Right[x ] Left[ ]  Skin: [ ] itching, [ ]burning, [ ] rashes  Endocrine: [ ] heat or cold intolerance  Musculoskeletal: [ ] joint pain or swelling; [ ] muscle, back, or extremity pain  Psychiatric: [ ] depression, [ ]anxiety, [ ]mood swings, or [ ]difficulty sleeping  Hematologic: [ ] easy bruising, [ ] bleeding gums    [x] All remaining systems negative except as per above.   [ ]Unable to obtain.    Vital Signs Last 24 Hrs  T(C): 36.9 (24 Jul 2021 11:50), Max: 36.9 (24 Jul 2021 11:50)  T(F): 98.4 (24 Jul 2021 11:50), Max: 98.4 (24 Jul 2021 11:50)  HR: 65 (24 Jul 2021 11:50) (65 - 65)  BP: 126/63 (24 Jul 2021 11:50) (126/63 - 126/63)  RR: 18 (24 Jul 2021 11:50) (18 - 18)  SpO2: 97% (24 Jul 2021 11:50) (97% - 97%)  I&O's Summary      PHYSICAL EXAM:  General: No acute distress BMI-26  HEENT: EOMI, PERRL  Neck: Supple, [ ] JVD  Lungs: Equal air entry bilaterally; [ ] rales [ ] wheezing [ ] rhonchi  Heart: Regular rate and rhythm; [x ] murmur  2 /6 [x ] systolic [ ] diastolic [ ] radiation[ ] rubs [ ]  gallops  Abdomen: Nontender, bowel sounds present  Extremities: No clubbing, cyanosis, [ ] edema [ ]Pulses  equal and intact  Nervous system:  Alert & Oriented X3, no focal deficits  Psychiatric: Normal affect  Skin: No rashes or lesions    LABS:  07-23    138  |  104  |  22  ----------------------------<  95  3.8   |  21<L>  |  0.98    Ca    8.5      23 Jul 2021 06:14  Phos  3.8     07-23  Mg     2.10     07-23    TPro  6.5  /  Alb  3.7  /  TBili  0.6  /  DBili  x   /  AST  19  /  ALT  16  /  AlkPhos  79  07-22        Creatinine Trend: 0.98<--, 0.95<--, 0.87<--, 0.83<--      TELEMETRY:Reviewed monitor tracings-Sinus rhythm      IMPRESSION AND PLAN:  80 yo  F with restless leg syndrome and remote hx of gallstone pancreatitis in 2017 presented to the ED due to new onset R sided weakness and dysarthria.     L pontine infarct - Likely small vessel stroke.      symptoms seem to have been fluctuating.    on exam mod R facial, RUE 3/5 RLE 4+/5 and dysarthria with a NIHSS of ~6-7.   patient takes no AT or statin at home   CTH neg, CTA h/N unremarkable and no LVO     - Dual antiplatelet therapy with ASA 81mg PO daily and Clopidogrel 75mg PO daily x 3 weeks followed by monotherapy with ASA 81mg PO daily.  - atorvastatin 40mg PO daily. LDL goal <70mg/dL.  - permissive HTN, -180mmHg x 48 hours then normotension   - Pt recommends Rehab - family wants to take patient home      Hyponatremia.    Encourage PO intake

## 2021-07-24 NOTE — PROGRESS NOTE ADULT - ASSESSMENT
80 yo  F with restless leg syndrome and remote hx of gallstone pancreatitis in 2017 presented to the ED due to new onset R sided weakness and dysarthria. symptoms seem to have been fluctuating.  on my exam mod R facial, RUE 3/5 RLE 4+/5 and dysarthria with a NIHSS of ~6-7. patient takes no AT or statin at home   CTH neg, CTA h/N unremarkable and no LVO   , A1c 5.6  MRI confirms L pontine infarct   TTE as above   Likely small vessel stroke.    - Dual antiplatelet therapy with ASA 81mg PO daily and Clopidogrel 75mg PO daily x 3 weeks followed by monotherapy with ASA 81mg PO daily.  - High dose statin therapy - atorvastatin 40mg PO daily. LDL goal <70mg/dL.  - telemetry  - PT/OT/SS/SLP, OOBC --> rehab refused   - check FS, glucose control <180  - GI/DVT ppx  - Counseling on diet, exercise, and medication adherence was done  - Counseling on smoking cessation and alcohol consumption offered when appropriate.  - Pain assessed and judicious use of narcotics when appropriate was discussed.    - Stroke education given when appropriate.  - Importance of fall prevention discussed.   - Differential diagnosis and plan of care discussed with patient and/or family and primary team  - Thank you for allowing me to participate in the care of this patient. Call with questions.   - spoke with both daughters at bedside. want to take patient home not rehab. d/c planning when able. okay for d/c from neuro standpoint. d/c plan home today f/u in office in ~2 weeks   Reyes Fitzpatrick MD  Vascular Neurology  Office: [phone] .   
81F with PMH of gallstone pancreatitis s/p cholecystectomy, RLS here w/ slurred speech and right sided weakness. Concern for CVA. Admitted for stroke workup. 
80 yo  F with restless leg syndrome and remote hx of gallstone pancreatitis in 2017 presented to the ED due to new onset R sided weakness and dysarthria. symptoms seem to have been fluctuating.  on my exam mod R facial, RUE 3/5 RLE 4+/5 and dysarthria with a NIHSS of ~6-7. patient takes no AT or statin at home   CTH neg, CTA h/N unremarkable and no LVO   , A1c 5.6  MRI confirms L pontine infarct   TTE as above   Likely small vessel stroke.    - Dual antiplatelet therapy with ASA 81mg PO daily and Clopidogrel 75mg PO daily x 3 weeks followed by monotherapy with ASA 81mg PO daily.  - High dose statin therapy - atorvastatin 40mg PO daily. LDL goal <70mg/dL.  - telemetry  - PT/OT/SS/SLP, OOBC  - permissive HTN, -180mmHg x 48 hours then normotension   - check FS, glucose control <180  - GI/DVT ppx  - Counseling on diet, exercise, and medication adherence was done  - Counseling on smoking cessation and alcohol consumption offered when appropriate.  - Pain assessed and judicious use of narcotics when appropriate was discussed.    - Stroke education given when appropriate.  - Importance of fall prevention discussed.   - Differential diagnosis and plan of care discussed with patient and/or family and primary team  - Thank you for allowing me to participate in the care of this patient. Call with questions.   - spoke with both daughters at bedside. want to take patient home not rehab. d/c planning when able. okay for d/c from neuro standpoint   Reyes Fitzpatrick MD  Vascular Neurology  Office: [phone] .   
81F with PMH of gallstone pancreatitis s/p cholecystectomy, RLS here w/ slurred speech and right sided weakness. Concern for CVA. Admitted for stroke workup. 
82 yo  F with restless leg syndrome and remote hx of gallstone pancreatitis in 2017 presented to the ED due to new onset R sided weakness and dysarthria. symptoms seem to have been fluctuating.  on my exam mod R facial, RUE 3/5 RLE 4+/5 and dysarthria with a NIHSS of ~6-7. patient takes no AT or statin at home   CTH neg, CTA h/N unremarkable and no LVO   , A1c 5.6  MRI confirms L pontine infarct   TTE as above   Likely small vessel stroke.    - Dual antiplatelet therapy with ASA 81mg PO daily and Clopidogrel 75mg PO daily x 3 weeks followed by monotherapy with ASA 81mg PO daily.  - High dose statin therapy - atorvastatin 40mg PO daily. LDL goal <70mg/dL.  - telemetry  - PT/OT/SS/SLP, OOBC  - permissive HTN, -180mmHg x 48 hours then normotension   - check FS, glucose control <180  - GI/DVT ppx  - Counseling on diet, exercise, and medication adherence was done  - Counseling on smoking cessation and alcohol consumption offered when appropriate.  - Pain assessed and judicious use of narcotics when appropriate was discussed.    - Stroke education given when appropriate.  - Importance of fall prevention discussed.   - Differential diagnosis and plan of care discussed with patient and/or family and primary team  - Thank you for allowing me to participate in the care of this patient. Call with questions.   - spoke with both daughters at bedside. want to take patient home not rehab. d/c planning when able   Reyes Fitzpatrick MD  Vascular Neurology  Office: [phone] .

## 2021-07-24 NOTE — DISCHARGE NOTE NURSING/CASE MANAGEMENT/SOCIAL WORK - PATIENT PORTAL LINK FT
You can access the FollowMyHealth Patient Portal offered by [facility] by registering at the following website: [email or web address]. By joining [facility]’s FollowMyHealth portal, you will also be able to view your health information using other applications (apps) compatible with our system.

## 2021-07-24 NOTE — DISCHARGE NOTE NURSING/CASE MANAGEMENT/SOCIAL WORK - NSDCFUADDAPPT_GEN_ALL_CORE_FT
If you are in need of a general medicine physician and post-discharge medical follow-up for further care/recommendations you may contact the [facility] for an appointment [phone]

## 2021-07-25 NOTE — PROGRESS NOTE ADULT - REASON FOR ADMISSION
R/o stroke
Stroke
R/o stroke

## 2021-07-25 NOTE — PROGRESS NOTE ADULT - TIME BILLING
- Review of records, telemetry, vital signs and daily labs.   - General and cardiovascular physical examination.  - Generation of cardiovascular treatment plan.  - Coordination of care.      Patient was seen and examined by me on 07/23/2021,interim events noted,labs and radiology studies reviewed.  Refugio Russo MD,FACC.  [address] [place], [place] [id number].  [phone]
- Review of records, telemetry, vital signs and daily labs.   - General and cardiovascular physical examination.  - Generation of cardiovascular treatment plan.  - Coordination of care.      Patient was seen and examined by me on07/23/2021,interim events noted,labs and radiology studies reviewed.  Refugio Russo MD,FACC.  [address] [place], [place] [id number].  [phone]
- Review of records, telemetry, vital signs and daily labs.   - General and cardiovascular physical examination.  - Generation of cardiovascular treatment plan.  - Coordination of care.      Patient was seen and examined by me on 07/22/2021,interim events noted,labs and radiology studies reviewed.  Refugio Russo MD,FACC.  [address] [place], [place] [id number].  [phone]
- Review of records, telemetry, vital signs and daily labs.   - General and cardiovascular physical examination.  - Generation of cardiovascular treatment plan.  - Coordination of care.      Patient was seen and examined by me on 07/24/2021,interim events noted,labs and radiology studies reviewed.  Refugio Russo MD,FACC.  [address] [place], [place] [id number].  [phone]
- Review of records, telemetry, vital signs and daily labs.   - General and cardiovascular physical examination.  - Generation of cardiovascular treatment plan.  - Coordination of care.      Patient was seen and examined by me on 07/21/2021,interim events noted,labs and radiology studies reviewed.  Refugio Russo MD,FACC.  [address] [place], [place] [id number].  [phone]

## 2021-07-25 NOTE — PROGRESS NOTE ADULT - PROVIDER SPECIALTY LIST ADULT
Cardiology
Cardiology
Neurology
Cardiology
Cardiology
Neurology
Cardiology
Neurology
Internal Medicine
Internal Medicine

## 2021-08-03 DIAGNOSIS — E87.1 HYPO-OSMOLALITY AND HYPONATREMIA: ICD-10-CM

## 2021-08-03 DIAGNOSIS — Z86.69 PERSONAL HISTORY OF OTHER DISEASES OF THE NERVOUS SYSTEM AND SENSE ORGANS: ICD-10-CM

## 2021-08-03 RX ORDER — ASPIRIN ENTERIC COATED TABLETS 81 MG 81 MG/1
81 TABLET, DELAYED RELEASE ORAL
Refills: 0 | Status: ACTIVE | COMMUNITY

## 2021-08-06 ENCOUNTER — APPOINTMENT (OUTPATIENT)
Dept: CARDIOLOGY | Facility: CLINIC | Age: 81
End: 2021-08-06
Payer: MEDICARE

## 2021-08-06 ENCOUNTER — NON-APPOINTMENT (OUTPATIENT)
Age: 81
End: 2021-08-06

## 2021-08-06 VITALS — SYSTOLIC BLOOD PRESSURE: 156 MMHG | DIASTOLIC BLOOD PRESSURE: 90 MMHG

## 2021-08-06 VITALS
OXYGEN SATURATION: 98 % | TEMPERATURE: 97.8 F | HEART RATE: 66 BPM | SYSTOLIC BLOOD PRESSURE: 140 MMHG | DIASTOLIC BLOOD PRESSURE: 81 MMHG | BODY MASS INDEX: 24.03 KG/M2 | WEIGHT: 140 LBS | RESPIRATION RATE: 16 BRPM

## 2021-08-06 PROCEDURE — 99204 OFFICE O/P NEW MOD 45 MIN: CPT

## 2021-08-06 PROCEDURE — 93000 ELECTROCARDIOGRAM COMPLETE: CPT

## 2021-08-06 NOTE — ASSESSMENT
[FreeTextEntry1] : 1. Schedule MANDEEP to evaluate for PFO.\par 2. Will need ILR to evaluate for possible afib given lack of other risk factors for CVA, as well as advanced age. Will ask Dr. Galvan to place. \par 3. Already on DAPT and atorvastatin, PMD checked blood work today.

## 2021-08-06 NOTE — REASON FOR VISIT
[Symptom and Test Evaluation] : symptom and test evaluation [FreeTextEntry1] : Very pleasant 81 year old recently admitted with pontine CVA, with residual Left hand and left leg weakness. Echo revealed possible PFO, and no further evaluation was made. No h/o HTN, no HLD, No DM, active prior to CVA. \par \par 1. Schedule MANDEEP to evaluate for PFO.\par 2. Will need ILR to evaluate for possible afib given lack of other risk factors for CVA, as well as advanced age. Will ask Dr. Galvan to place. \par 3. Already on DAPT and atorvastatin, PMD checked blood work today.

## 2021-08-09 DIAGNOSIS — Z01.818 ENCOUNTER FOR OTHER PREPROCEDURAL EXAMINATION: ICD-10-CM

## 2021-08-13 ENCOUNTER — APPOINTMENT (OUTPATIENT)
Dept: DISASTER EMERGENCY | Facility: CLINIC | Age: 81
End: 2021-08-13

## 2021-09-09 ENCOUNTER — APPOINTMENT (OUTPATIENT)
Dept: ELECTROPHYSIOLOGY | Facility: CLINIC | Age: 81
End: 2021-09-09

## 2021-11-30 ENCOUNTER — APPOINTMENT (OUTPATIENT)
Dept: DISASTER EMERGENCY | Facility: CLINIC | Age: 81
End: 2021-11-30

## 2021-11-30 LAB — SARS-COV-2 N GENE NPH QL NAA+PROBE: NOT DETECTED

## 2021-12-03 ENCOUNTER — OUTPATIENT (OUTPATIENT)
Dept: OUTPATIENT SERVICES | Facility: HOSPITAL | Age: 81
LOS: 1 days | Discharge: HOME | End: 2021-12-03
Payer: MEDICARE

## 2021-12-03 VITALS — WEIGHT: 149.91 LBS | HEIGHT: 63 IN

## 2021-12-03 DIAGNOSIS — Z90.49 ACQUIRED ABSENCE OF OTHER SPECIFIED PARTS OF DIGESTIVE TRACT: Chronic | ICD-10-CM

## 2021-12-03 PROCEDURE — 93312 ECHO TRANSESOPHAGEAL: CPT | Mod: 26

## 2021-12-03 PROCEDURE — 93320 DOPPLER ECHO COMPLETE: CPT | Mod: 26,GC

## 2021-12-03 PROCEDURE — 93325 DOPPLER ECHO COLOR FLOW MAPG: CPT | Mod: 26,GC

## 2021-12-03 PROCEDURE — 33285 INSJ SUBQ CAR RHYTHM MNTR: CPT

## 2021-12-03 RX ORDER — ASPIRIN/CALCIUM CARB/MAGNESIUM 324 MG
1 TABLET ORAL
Qty: 30 | Refills: 3 | DISCHARGE

## 2021-12-03 RX ORDER — ATORVASTATIN CALCIUM 80 MG/1
1 TABLET, FILM COATED ORAL
Qty: 30 | Refills: 0 | DISCHARGE

## 2021-12-03 RX ORDER — LOSARTAN POTASSIUM 100 MG/1
1 TABLET, FILM COATED ORAL
Qty: 0 | Refills: 0 | DISCHARGE

## 2021-12-03 RX ORDER — SODIUM CHLORIDE 9 MG/ML
3 INJECTION INTRAMUSCULAR; INTRAVENOUS; SUBCUTANEOUS EVERY 8 HOURS
Refills: 0 | Status: DISCONTINUED | OUTPATIENT
Start: 2021-12-03 | End: 2021-12-17

## 2021-12-03 NOTE — H&P CARDIOLOGY - NEGATIVE NEUROLOGICAL SYMPTOMS
no transient paralysis/no paresthesias/no generalized seizures/no focal seizures/no syncope/no tremors/no vertigo/no loss of sensation/no difficulty walking/no headache/no loss of consciousness/no hemiparesis/no confusion

## 2021-12-03 NOTE — H&P CARDIOLOGY - OTHER
7/22/21 MRI head: Acute infarct involving the left nickolas.  7/21/21 Echo: EF 64%. Mitral annular calcification, otherwise normal mitral valve. Minimal mitral regurgitation. Calcified trileaflet aortic valve with normal opening. Mild aortic regurgitation. Normal left ventricular internal dimensions and wall thicknesses. Normal left ventricular systolic function. No segmental wall motion abnormalities. Mild diastolic dysfunction (Stage I). Normal right ventricular size and function. A bubble study was performed with the intravenous injection of agitated saline contrast. Following contrast injection, bubbles were seen in the left heart. This may reflect the presence of an intracardiac shunt. No obvious cardiac source of embolus was identified on this transthoracic study. If clinical suspicion is high, consider MANDEEP for further evaluation.

## 2021-12-03 NOTE — H&P CARDIOLOGY - NSICDXPASTMEDICALHX_GEN_ALL_CORE_FT
PAST MEDICAL HISTORY:  CVA (cerebrovascular accident) Right sided weakness now recovered    HTN (hypertension)

## 2021-12-03 NOTE — H&P CARDIOLOGY - NOTES
Pt is independent without the use of equipment s/p aggressive physical therapy but has a cane if needed.

## 2021-12-03 NOTE — H&P CARDIOLOGY - HISTORY OF PRESENT ILLNESS
80 y/o female with a PMHx of CVA with residual right sided weakness (now recovered s/p aggressive physical and occupational therapy) and HTN presents for elective ILR (implantable loop recorder) and MANDEEP (transesophageal echocardiogram). Pt had an acute left nickolas infarct in July 2021 and initially had right sided weakness, more so the upper extremity, and underwent aggressive therapy. Pt had an echocardiogram with bubble study at that time which was concerning for intracardiac shunt. Pt is now scheduled for MANDEEP and ILR placement to assess for possible PFO and to assess if patient has underlying paroxysmal atrial fibrillation. Pt has no complaints at this time and denies fever, chills, recent travel, headache, dizziness, visual deficits, chest pain, shortness of breath, orthopnea, palpitations, abdominal pain, N/V/D/C, hematochezia, melena, dysuria, hematuria, LOC, syncope, peripheral edema.    NPO Date and Time: 12/2/21 at 22:00  Mallampati: 2  Anticoagulation Date and Time? N/A  Previous Endoscopy? No  Hx of CVA? Yes  Sleep Apnea? No  Dentures? Yes (upper)  Loose Teeth? No    Patient Denies:  Prior difficult intubation or airway problems  Odynophagia  Dysphagia  Esophageal stricture  Esophageal tumor  Esophageal varices  Esophageal perforation/laceration  Esophageal diverticulum  Large diaphragmatic Hernia  Active or recent upper gastrointestinal (GI) bleed  History of GI surgery  History of Esophageal surgery  History of Penny's esophagus  Tenuous cardiorespiratory status  Cervical spine arthritis with reduced range of motion or atlantoaxial joint disease  History of radiation to head, neck, or mediastinum  Severe thrombocytopenia  Obstructive sleep apnea    Cardiologist: Dr. Chai Ford  COVID status: PCR negative 11/30

## 2021-12-08 PROBLEM — I10 ESSENTIAL (PRIMARY) HYPERTENSION: Chronic | Status: ACTIVE | Noted: 2021-12-03

## 2021-12-08 PROBLEM — I63.9 CEREBRAL INFARCTION, UNSPECIFIED: Chronic | Status: ACTIVE | Noted: 2021-12-03

## 2021-12-16 ENCOUNTER — NON-APPOINTMENT (OUTPATIENT)
Age: 81
End: 2021-12-16

## 2021-12-16 ENCOUNTER — APPOINTMENT (OUTPATIENT)
Dept: CARDIOLOGY | Facility: CLINIC | Age: 81
End: 2021-12-16
Payer: MEDICARE

## 2021-12-16 ENCOUNTER — APPOINTMENT (OUTPATIENT)
Dept: ELECTROPHYSIOLOGY | Facility: CLINIC | Age: 81
End: 2021-12-16
Payer: MEDICARE

## 2021-12-16 VITALS
SYSTOLIC BLOOD PRESSURE: 130 MMHG | HEART RATE: 76 BPM | DIASTOLIC BLOOD PRESSURE: 74 MMHG | OXYGEN SATURATION: 98 % | TEMPERATURE: 97.4 F | BODY MASS INDEX: 23.9 KG/M2 | HEIGHT: 64 IN | WEIGHT: 140 LBS | RESPIRATION RATE: 16 BRPM

## 2021-12-16 PROCEDURE — 93000 ELECTROCARDIOGRAM COMPLETE: CPT

## 2021-12-16 PROCEDURE — 93285 PRGRMG DEV EVAL SCRMS IP: CPT

## 2021-12-16 PROCEDURE — 99214 OFFICE O/P EST MOD 30 MIN: CPT

## 2021-12-16 RX ORDER — UBIDECARENONE/VIT E ACET 100MG-5
CAPSULE ORAL
Refills: 0 | Status: ACTIVE | COMMUNITY

## 2021-12-16 RX ORDER — VITAMIN E (DL,TOCOPHERYL ACET) 180 MG
CAPSULE ORAL
Refills: 0 | Status: DISCONTINUED | COMMUNITY
End: 2021-12-16

## 2021-12-16 RX ORDER — CLOPIDOGREL BISULFATE 75 MG/1
75 TABLET, FILM COATED ORAL DAILY
Refills: 0 | Status: DISCONTINUED | COMMUNITY
End: 2021-12-16

## 2021-12-16 NOTE — REASON FOR VISIT
[Symptom and Test Evaluation] : symptom and test evaluation [FreeTextEntry1] : Pt with good strength and speech now. Little residual for CVA. No recurrent sx.\par \par MANDEEP with good biv function and normal valves. No MINOO clot, but + PFO.\par ILR placed.\par \par 1. PFO. For now, continue ASA, Lipitor, and losartan. No role at this point for PFO closure. However, if patient develops recurrent sx, will need to have PFO closure.\par 2. HLD. Continue atorvastatin. Will obtain labs from PMD.\par 3. HTN. BP well controlled upon reevaluation. Continue losartan. ECG done and reviewed, no LVH. \par

## 2021-12-16 NOTE — ASSESSMENT
[FreeTextEntry1] : 1. PFO. For now, continue ASA, Lipitor, and losartan. No role at this point for PFO closure. However, if patient develops recurrent sx, will need to have PFO closure.\par 2. HLD. Continue atorvastatin. Will obtain labs from PMD.\par 3. HTN. BP well controlled upon reevaluation. Continue losartan. ECG done and reviewed, no LVH.

## 2022-01-21 ENCOUNTER — NON-APPOINTMENT (OUTPATIENT)
Age: 82
End: 2022-01-21

## 2022-01-21 ENCOUNTER — APPOINTMENT (OUTPATIENT)
Dept: ELECTROPHYSIOLOGY | Facility: CLINIC | Age: 82
End: 2022-01-21
Payer: MEDICARE

## 2022-01-21 PROCEDURE — 93298 REM INTERROG DEV EVAL SCRMS: CPT

## 2022-01-21 PROCEDURE — G2066: CPT

## 2022-02-24 ENCOUNTER — APPOINTMENT (OUTPATIENT)
Dept: ELECTROPHYSIOLOGY | Facility: CLINIC | Age: 82
End: 2022-02-24
Payer: MEDICARE

## 2022-02-24 ENCOUNTER — NON-APPOINTMENT (OUTPATIENT)
Age: 82
End: 2022-02-24

## 2022-02-24 PROCEDURE — 93298 REM INTERROG DEV EVAL SCRMS: CPT

## 2022-02-24 PROCEDURE — G2066: CPT

## 2022-03-29 ENCOUNTER — APPOINTMENT (OUTPATIENT)
Dept: CARDIOLOGY | Facility: CLINIC | Age: 82
End: 2022-03-29
Payer: MEDICARE

## 2022-03-29 ENCOUNTER — NON-APPOINTMENT (OUTPATIENT)
Age: 82
End: 2022-03-29

## 2022-03-29 VITALS
DIASTOLIC BLOOD PRESSURE: 84 MMHG | HEIGHT: 60 IN | OXYGEN SATURATION: 98 % | SYSTOLIC BLOOD PRESSURE: 129 MMHG | HEART RATE: 72 BPM | WEIGHT: 143 LBS | BODY MASS INDEX: 28.07 KG/M2 | RESPIRATION RATE: 16 BRPM

## 2022-03-29 PROCEDURE — 99214 OFFICE O/P EST MOD 30 MIN: CPT

## 2022-03-29 PROCEDURE — 93000 ELECTROCARDIOGRAM COMPLETE: CPT

## 2022-03-29 NOTE — ASSESSMENT
[FreeTextEntry1] : 1. HTN. BP well controlled upon reevaluation.\par 2. HLD. Continue atorvastatin.\par 3. PFO. No further events. Monitor clinically for now.

## 2022-03-29 NOTE — REASON FOR VISIT
[Symptom and Test Evaluation] : symptom and test evaluation [Hyperlipidemia] : hyperlipidemia [Hypertension] : hypertension [FreeTextEntry1] : Generally doing well, no CP or dyspnea.\par No events overall. \par Starting to garden.\par \par 1. HTN. BP well controlled upon reevaluation.\par 2. HLD. Continue atorvastatin.\par 3. PFO. No further events. Monitor clinically for now.

## 2022-03-31 ENCOUNTER — NON-APPOINTMENT (OUTPATIENT)
Age: 82
End: 2022-03-31

## 2022-03-31 ENCOUNTER — APPOINTMENT (OUTPATIENT)
Dept: ELECTROPHYSIOLOGY | Facility: CLINIC | Age: 82
End: 2022-03-31
Payer: MEDICARE

## 2022-03-31 PROCEDURE — 93298 REM INTERROG DEV EVAL SCRMS: CPT

## 2022-03-31 PROCEDURE — G2066: CPT

## 2022-05-05 ENCOUNTER — NON-APPOINTMENT (OUTPATIENT)
Age: 82
End: 2022-05-05

## 2022-05-05 ENCOUNTER — APPOINTMENT (OUTPATIENT)
Dept: ELECTROPHYSIOLOGY | Facility: CLINIC | Age: 82
End: 2022-05-05

## 2022-05-05 ENCOUNTER — APPOINTMENT (OUTPATIENT)
Dept: ELECTROPHYSIOLOGY | Facility: CLINIC | Age: 82
End: 2022-05-05
Payer: MEDICARE

## 2022-05-05 PROCEDURE — G2066: CPT

## 2022-05-05 PROCEDURE — 93298 REM INTERROG DEV EVAL SCRMS: CPT

## 2022-05-10 NOTE — ED ADULT TRIAGE NOTE - MODE OF ARRIVAL
Render Post-Care Instructions In Note?: no Number Of Freeze-Thaw Cycles: 2 freeze-thaw cycles Application Tool (Optional): Liquid Nitrogen Sprayer Walk in Show Applicator Variable?: Yes Detail Level: Detailed Post-Care Instructions: I reviewed with the patient in detail post-care instructions. Patient is to wear sunprotection, and avoid picking at any of the treated lesions. Pt may apply Vaseline to crusted or scabbing areas. Duration Of Freeze Thaw-Cycle (Seconds): 3 Consent: The patient's consent was obtained including but not limited to risks of crusting, scabbing, blistering, scarring, darker or lighter pigmentary change, recurrence, incomplete removal and infection.

## 2022-06-09 ENCOUNTER — APPOINTMENT (OUTPATIENT)
Dept: ELECTROPHYSIOLOGY | Facility: CLINIC | Age: 82
End: 2022-06-09
Payer: MEDICARE

## 2022-06-09 ENCOUNTER — NON-APPOINTMENT (OUTPATIENT)
Age: 82
End: 2022-06-09

## 2022-06-09 PROCEDURE — G2066: CPT

## 2022-06-09 PROCEDURE — 93298 REM INTERROG DEV EVAL SCRMS: CPT

## 2022-07-14 ENCOUNTER — APPOINTMENT (OUTPATIENT)
Dept: ELECTROPHYSIOLOGY | Facility: CLINIC | Age: 82
End: 2022-07-14

## 2022-07-14 ENCOUNTER — NON-APPOINTMENT (OUTPATIENT)
Age: 82
End: 2022-07-14

## 2022-07-14 PROCEDURE — 93298 REM INTERROG DEV EVAL SCRMS: CPT

## 2022-07-14 PROCEDURE — G2066: CPT

## 2022-08-18 ENCOUNTER — NON-APPOINTMENT (OUTPATIENT)
Age: 82
End: 2022-08-18

## 2022-08-18 ENCOUNTER — APPOINTMENT (OUTPATIENT)
Dept: ELECTROPHYSIOLOGY | Facility: CLINIC | Age: 82
End: 2022-08-18

## 2022-08-18 PROCEDURE — 93298 REM INTERROG DEV EVAL SCRMS: CPT

## 2022-08-18 PROCEDURE — G2066: CPT

## 2022-09-23 ENCOUNTER — NON-APPOINTMENT (OUTPATIENT)
Age: 82
End: 2022-09-23

## 2022-09-23 ENCOUNTER — APPOINTMENT (OUTPATIENT)
Dept: ELECTROPHYSIOLOGY | Facility: CLINIC | Age: 82
End: 2022-09-23
Payer: MEDICARE

## 2022-09-23 PROCEDURE — G2066: CPT

## 2022-09-23 PROCEDURE — 93298 REM INTERROG DEV EVAL SCRMS: CPT

## 2022-10-18 ENCOUNTER — NON-APPOINTMENT (OUTPATIENT)
Age: 82
End: 2022-10-18

## 2022-10-18 ENCOUNTER — APPOINTMENT (OUTPATIENT)
Dept: CARDIOLOGY | Facility: CLINIC | Age: 82
End: 2022-10-18
Payer: MEDICARE

## 2022-10-18 VITALS
HEART RATE: 69 BPM | HEIGHT: 60 IN | WEIGHT: 142 LBS | SYSTOLIC BLOOD PRESSURE: 167 MMHG | OXYGEN SATURATION: 100 % | DIASTOLIC BLOOD PRESSURE: 83 MMHG | BODY MASS INDEX: 27.88 KG/M2

## 2022-10-18 PROCEDURE — 99214 OFFICE O/P EST MOD 30 MIN: CPT | Mod: 25

## 2022-10-18 PROCEDURE — 93000 ELECTROCARDIOGRAM COMPLETE: CPT

## 2022-10-18 NOTE — REASON FOR VISIT
[Hypertension] : hypertension [Carotid, Aortic and Peripheral Vascular Disease] : carotid, aortic and peripheral vascular disease [FreeTextEntry1] : No events on ILR (placed 12/3/21)\par BP elevated.\par No recent blood work on atorvastatin. \par \par 1. HTN. Blood pressure remains elevated, even upon reevaluation. Increase losartan to 100 mg daily.\par 2. HLD. Continue atorvastatin. Rx given for blood work.\par 3. CVA. Loop unremarkable. Pt does not want removal at this point. Continue to monitor.

## 2022-10-18 NOTE — ASSESSMENT
[FreeTextEntry1] : 1. HTN. Blood pressure remains elevated, even upon reevaluation. Increase losartan to 100 mg daily.\par 2. HLD. Continue atorvastatin. Rx given for blood work.\par 3. CVA. Loop unremarkable. Pt does not want removal at this point. Continue to monitor.

## 2022-10-18 NOTE — PHYSICAL EXAM
[Well Developed] : well developed [Well Nourished] : well nourished [No Acute Distress] : no acute distress [Normal Conjunctiva] : normal conjunctiva [Normal Venous Pressure] : normal venous pressure [No Carotid Bruit] : no carotid bruit [Normal S1, S2] : normal S1, S2 [No Murmur] : no murmur [No Rub] : no rub [No Gallop] : no gallop [Clear Lung Fields] : clear lung fields [Good Air Entry] : good air entry [No Respiratory Distress] : no respiratory distress  [Soft] : abdomen soft [Non Tender] : non-tender [No Masses/organomegaly] : no masses/organomegaly [Normal Bowel Sounds] : normal bowel sounds [Normal Gait] : normal gait [No Edema] : no edema [No Cyanosis] : no cyanosis [No Clubbing] : no clubbing [No Rash] : no rash [No Varicosities] : no varicosities [Moves all extremities] : moves all extremities [No Skin Lesions] : no skin lesions [No Focal Deficits] : no focal deficits [Normal Speech] : normal speech [Alert and Oriented] : alert and oriented [Normal memory] : normal memory

## 2022-10-28 ENCOUNTER — APPOINTMENT (OUTPATIENT)
Dept: ELECTROPHYSIOLOGY | Facility: CLINIC | Age: 82
End: 2022-10-28
Payer: MEDICARE

## 2022-10-28 ENCOUNTER — NON-APPOINTMENT (OUTPATIENT)
Age: 82
End: 2022-10-28

## 2022-10-28 PROCEDURE — G2066: CPT

## 2022-10-28 PROCEDURE — 93298 REM INTERROG DEV EVAL SCRMS: CPT

## 2022-11-12 NOTE — H&P ADULT - NSHPSOCIALHISTORY_GEN_ALL_CORE
No tobacco, ETOH, drug use.  Lives with family.   Independent w/ ADLs. Sudden numbness or weakness of the face, arm, or leg, especially on one side of the body. Confusion, trouble speaking or understanding. Trouble seeing in one or both eyes. Trouble walking, dizziness, loss of balance or coordination. Severe headache.

## 2022-12-02 ENCOUNTER — NON-APPOINTMENT (OUTPATIENT)
Age: 82
End: 2022-12-02

## 2022-12-02 ENCOUNTER — APPOINTMENT (OUTPATIENT)
Dept: ELECTROPHYSIOLOGY | Facility: CLINIC | Age: 82
End: 2022-12-02
Payer: MEDICARE

## 2022-12-02 PROCEDURE — 93298 REM INTERROG DEV EVAL SCRMS: CPT

## 2022-12-02 PROCEDURE — G2066: CPT

## 2022-12-13 NOTE — ED ADULT NURSE NOTE - OBJECTIVE STATEMENT
Detail Level: Generalized Detail Level: Simple p/t is a 81y old female received awake and responsive, c/o of an episode of slurred speech and generalized weakness this am, p/t denies any other discomfort

## 2023-01-06 ENCOUNTER — APPOINTMENT (OUTPATIENT)
Dept: ELECTROPHYSIOLOGY | Facility: CLINIC | Age: 83
End: 2023-01-06
Payer: MEDICARE

## 2023-01-06 ENCOUNTER — NON-APPOINTMENT (OUTPATIENT)
Age: 83
End: 2023-01-06

## 2023-01-06 PROCEDURE — G2066: CPT

## 2023-01-06 PROCEDURE — 93298 REM INTERROG DEV EVAL SCRMS: CPT

## 2023-01-17 ENCOUNTER — RX RENEWAL (OUTPATIENT)
Age: 83
End: 2023-01-17

## 2023-02-10 ENCOUNTER — APPOINTMENT (OUTPATIENT)
Dept: ELECTROPHYSIOLOGY | Facility: CLINIC | Age: 83
End: 2023-02-10

## 2023-03-25 NOTE — CONSULT NOTE ADULT - SUBJECTIVE AND OBJECTIVE BOX
PATIENT SEEN AND EXAMINED ON :-07/20/2021  DATE OF SERVICE:    07/20/2021    Interim events noted,Labs ,Radiological studies and Cardiology tests reviewed .      Assessment:  · Assessment	  81F with PMH of gallstone pancreatitis s/p cholecystectomy, RLS here w/ slurred speech and right sided weakness. Concern for CVA. Admitted for stroke workup.       History of Present Illness:   81F with PMH of RLS, gallstone pancreatitis brought in by family w/ slurred speech and right sided weakness. Around 530AM, pt felt dizzy. No room spinning. Dizziness seemed ot have resolved as the day progressed. Later in the morning, the patient noticed right sided weakness causing her to have difficulty ambulating around the house and use of her upper extremities. Sx were persistent and did not improve. She was then brought to the hospital for further evaluation. Otherwise, no recent illnesses, sick contacts, F/C, N/V, CP, palps, abd pain, diarrhea, dysuria.     ED course: 177/89, 72, 20, 98.4F, 100% RA. Stroke code.         Review of Systems:  Other Review of Systems: All other review of systems negative, except as noted in HPI      Allergies and Intolerances:        Allergies:  	No Known Allergies:     Home Medications:   * Patient Currently Takes Medications as of 20-Jul-2021 18:46 documented in Structured Notes  · 	Hair, Skin & Nails 5 mg oral capsule: Last Dose Taken:  , 1 cap(s) orally 3 times a week  · 	Vitamin D3 2000 intl units (50 mcg) oral tablet: Last Dose Taken:  , 1 tab(s) orally once a day    .    Patient History:   Past Medical, Past Surgical, and Family History:  PAST MEDICAL HISTORY:  Gallstone pancreatitis.     PAST SURGICAL HISTORY:  S/P cholecystectomy.     FAMILY HISTORY:  No pertinent family history in first degree relatives.     No Pertinent Family History in first degree relatives of: No known strokes in family.    Social History:  Social History (marital status, living situation, occupation, tobacco use, alcohol and drug use, and sexual history): No tobacco, ETOH, drug use.  Lives with family.   Independent w/ ADLs.    Tobacco Screening:  · Core Measure Site	No  · Has the patient used tobacco in the past 30 days?	No    Risk Assessment:   Present on Admission:  Deep Venous Thrombosis	no  Pulmonary Embolus	no    Heart Failure:  Does this patient have a history of or has been diagnosed with heart failure? no.    Physical Exam:   Physical Exam: Vital Signs Last 24 Hrs  T(C): 36.9 (20 Jul 2021 14:07), Max: 36.9 (20 Jul 2021 14:07)  T(F): 98.4 (20 Jul 2021 14:07), Max: 98.4 (20 Jul 2021 14:07)  HR: 72 (20 Jul 2021 14:07) (72 - 72)  BP: 177/89 (20 Jul 2021 14:07) (177/89 - 177/89)  BP(mean): --  RR: 20 (20 Jul 2021 14:07) (20 - 20)  SpO2: 100% (20 Jul 2021 14:07) (100% - 100%)    PHYSICAL:  Gen- In bed, comfortable, NAD  Eyes- EOMI, PERRLA, nonicteric.  EMNT- Fair dentition. MMM. No tonsilar exudates. No posterior pharynx erythema.  Neck- Supple. No masses. No tracheal deviation.  Resp- CTAB, good effort. No r/r/w. No accessory muscle use.  CVS- RRR, S1S2, no g/r/m. No LE edema.  GI- Soft abd, NT, ND, +BSx4. No HSM.  MSK- No C/C. ROM intact. No crepitus.   Neuro- CN II-XII intact. Slightly dysarthric speech. Sensation intact. Strength reduced RLE.  Skin- No rashes/ulcers. Warm/moist.  Psych- AAOx3. Appropriate mood/affect.      Labs and Results:  Labs, Radiology, Cardiology, and Other Results: 14.8   7.23  )-----------( 215      ( 20 Jul 2021 14:44 )             43.4     07-20    131<L>  |  97<L>  |  15  ----------------------------<  183<H>  4.0   |  21<L>  |  0.83    Ca    8.8      20 Jul 2021 14:44    TPro  7.0  /  Alb  4.1  /  TBili  0.4  /  DBili  x   /  AST  17  /  ALT  17  /  AlkPhos  89  07-20    PT/INR - ( 20 Jul 2021 14:44 )   PT: 11.6 sec;   INR: 1.02 ratio         PTT - ( 20 Jul 2021 14:44 )  PTT:28.2 sec      RADIOLOGY:  CT head was reviewed. Showed no acute stroke/ICH. Additional findings as below.    < from: CT Brain Stroke Protocol (07.20.21 @ 14:36) >    Old lacunar infarct versus prominent VR space is seen involving the left basal ganglia region    < end of copied text >      EKG: No EKG done   yes

## 2023-07-25 ENCOUNTER — APPOINTMENT (OUTPATIENT)
Dept: CARDIOLOGY | Facility: CLINIC | Age: 83
End: 2023-07-25
Payer: MEDICARE

## 2023-07-25 ENCOUNTER — NON-APPOINTMENT (OUTPATIENT)
Age: 83
End: 2023-07-25

## 2023-07-25 VITALS
BODY MASS INDEX: 26.11 KG/M2 | HEIGHT: 60 IN | SYSTOLIC BLOOD PRESSURE: 151 MMHG | HEART RATE: 66 BPM | DIASTOLIC BLOOD PRESSURE: 80 MMHG | WEIGHT: 133 LBS | OXYGEN SATURATION: 99 %

## 2023-07-25 DIAGNOSIS — I63.9 CEREBRAL INFARCTION, UNSPECIFIED: ICD-10-CM

## 2023-07-25 DIAGNOSIS — I10 ESSENTIAL (PRIMARY) HYPERTENSION: ICD-10-CM

## 2023-07-25 PROCEDURE — 99214 OFFICE O/P EST MOD 30 MIN: CPT | Mod: 25

## 2023-07-25 PROCEDURE — 93000 ELECTROCARDIOGRAM COMPLETE: CPT

## 2023-07-25 NOTE — ASSESSMENT
[FreeTextEntry1] : 1. The patient will keep a log of blood pressures at home, per protocol. We will review the log during the next visit.\par 2. HLD. Continue atorvastatin. Will send Rx for labs. \par 3. CVA. Aspirin 81 mg daily.

## 2023-07-25 NOTE — REASON FOR VISIT
[Symptom and Test Evaluation] : symptom and test evaluation [FreeTextEntry1] : No afib.\par BP has been elevated, but better on reevaluation today.\par No CP.\par \par 1. The patient will keep a log of blood pressures at home, per protocol. We will review the log during the next visit.\par 2. HLD. Continue atorvastatin. Will send Rx for labs. \par 3. CVA. Aspirin 81 mg daily.

## 2023-07-31 LAB
ALBUMIN SERPL ELPH-MCNC: 4.2 G/DL
ALP BLD-CCNC: 88 U/L
ALT SERPL-CCNC: 13 U/L
ANION GAP SERPL CALC-SCNC: 11 MMOL/L
AST SERPL-CCNC: 23 U/L
BILIRUB SERPL-MCNC: 0.5 MG/DL
BUN SERPL-MCNC: 26 MG/DL
CALCIUM SERPL-MCNC: 9.2 MG/DL
CHLORIDE SERPL-SCNC: 106 MMOL/L
CHOLEST SERPL-MCNC: 124 MG/DL
CO2 SERPL-SCNC: 24 MMOL/L
CREAT SERPL-MCNC: 1 MG/DL
EGFR: 56 ML/MIN/1.73M2
ESTIMATED AVERAGE GLUCOSE: 117 MG/DL
GLUCOSE SERPL-MCNC: 90 MG/DL
HBA1C MFR BLD HPLC: 5.7 %
HDLC SERPL-MCNC: 58 MG/DL
LDLC SERPL CALC-MCNC: 50 MG/DL
NONHDLC SERPL-MCNC: 67 MG/DL
POTASSIUM SERPL-SCNC: 5.4 MMOL/L
PROT SERPL-MCNC: 6.8 G/DL
SODIUM SERPL-SCNC: 140 MMOL/L
TRIGL SERPL-MCNC: 85 MG/DL

## 2023-10-30 DIAGNOSIS — R73.03 PREDIABETES.: ICD-10-CM

## 2023-12-12 ENCOUNTER — RX RENEWAL (OUTPATIENT)
Age: 83
End: 2023-12-12

## 2023-12-12 RX ORDER — LOSARTAN POTASSIUM 100 MG/1
100 TABLET, FILM COATED ORAL DAILY
Qty: 90 | Refills: 3 | Status: ACTIVE | COMMUNITY
Start: 1900-01-01 | End: 1900-01-01

## 2023-12-12 RX ORDER — ATORVASTATIN CALCIUM 40 MG/1
40 TABLET, FILM COATED ORAL AT BEDTIME
Qty: 90 | Refills: 3 | Status: ACTIVE | COMMUNITY
Start: 2023-01-17 | End: 1900-01-01

## 2024-02-26 NOTE — DISCHARGE NOTE ADULT - DEVELOP COPING SKILLS. FOR EXAMPLE, STRATEGIES AND LIFESTYLE CHANGES THAT REDUCE NEGATIVE MOODS, STRESS, AND EXPOSURE TO SMOKING CUES
Colonoscopy Discharge Instructions  Activity:  Do not drive or operate heavy machinery for the next 24 hours. The medication you have received may impair your reflexes and coordination.    You may feel sleepy or groggy for the rest of the day.    Tomorrow, you can resume your full activity unless otherwise instructed by your doctor.    If a polyp has been removed, for the next seven days  Do not take long car trips  Do not do any heavy lifting, straining, or running  Do not take any aspirin, or anti-inflammatory medications such as Advil, Aleve, Motrin or Ibuprofen for 7 to 10 days.  If you are on a blood thinner, ask your doctor when you can resume it.    Diet:  Resume a regular diet.    Do not drink alcohol for 24 hours.     Special Instructions:  It is normal to have mild abdominal pain and bloating.     It is normal to have gas right after the test. It can help to try to pass it to help prevent later bloating.     You may notice a small amount of blood in your stool or on the tissue paper today.    Call your Gastroenterologist if you develop the following:  Severe abdominal pain or bloating  Chills or fever of 100 degrees or more  Blood from your rectum or in your stools measuring greater than 1/3 cup.         Created: 10/2020   Created by:  Courtney    Revised:  10/2020  
Statement Selected

## 2024-04-29 NOTE — PATIENT PROFILE ADULT - NSPROMEDSADMININFO_GEN_A_NUR
Detail Level: Detailed
Quality 226: Preventive Care And Screening: Tobacco Use: Screening And Cessation Intervention: Patient screened for tobacco use and is an ex/non-smoker
Quality 130: Documentation Of Current Medications In The Medical Record: Current Medications Documented
Quality 431: Preventive Care And Screening: Unhealthy Alcohol Use - Screening: Patient not identified as an unhealthy alcohol user when screened for unhealthy alcohol use using a systematic screening method
Quality 47: Advance Care Plan: Advance Care Planning discussed and documented in the medical record; patient did not wish or was not able to name a surrogate decision maker or provide an advance care plan.
no concerns

## 2024-09-18 ENCOUNTER — RX RENEWAL (OUTPATIENT)
Age: 84
End: 2024-09-18

## 2024-10-19 NOTE — DISCHARGE NOTE PROVIDER - YES NO FOR MLM POSITIVE OR NEGATIVE COVID RESULT
,
Pt came from home, accompanied by mother for c/o cough x 2 weeks. No PMH. No sick contacts. Reports nausea and inability to hold food down. Pt stating that she has been vomiting every time after eating. Denies abdominal pain, fever/chills, SOB.

## 2024-12-29 NOTE — DISCHARGE NOTE NURSING/CASE MANAGEMENT/SOCIAL WORK - NSDCPETBCESMAN_GEN_ALL_CORE
If you are a smoker, it is important for your health to stop smoking. Please be aware that second hand smoke is also harmful. 30-Dec-2024

## 2025-05-29 ENCOUNTER — NON-APPOINTMENT (OUTPATIENT)
Age: 85
End: 2025-05-29

## 2025-05-29 ENCOUNTER — APPOINTMENT (OUTPATIENT)
Dept: CARDIOLOGY | Facility: CLINIC | Age: 85
End: 2025-05-29
Payer: MEDICARE

## 2025-05-29 VITALS
DIASTOLIC BLOOD PRESSURE: 80 MMHG | TEMPERATURE: 98.6 F | OXYGEN SATURATION: 99 % | BODY MASS INDEX: 27.88 KG/M2 | WEIGHT: 142 LBS | SYSTOLIC BLOOD PRESSURE: 134 MMHG | HEIGHT: 60 IN | HEART RATE: 75 BPM

## 2025-05-29 DIAGNOSIS — I10 ESSENTIAL (PRIMARY) HYPERTENSION: ICD-10-CM

## 2025-05-29 DIAGNOSIS — R73.03 PREDIABETES.: ICD-10-CM

## 2025-05-29 PROCEDURE — 93000 ELECTROCARDIOGRAM COMPLETE: CPT

## 2025-05-29 PROCEDURE — 99214 OFFICE O/P EST MOD 30 MIN: CPT

## 2025-05-29 PROCEDURE — G2211 COMPLEX E/M VISIT ADD ON: CPT

## 2025-09-11 DIAGNOSIS — Z13.6 ENCOUNTER FOR SCREENING FOR CARDIOVASCULAR DISORDERS: ICD-10-CM
